# Patient Record
Sex: FEMALE | Race: WHITE | NOT HISPANIC OR LATINO | Employment: STUDENT | URBAN - NONMETROPOLITAN AREA
[De-identification: names, ages, dates, MRNs, and addresses within clinical notes are randomized per-mention and may not be internally consistent; named-entity substitution may affect disease eponyms.]

---
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
## 2022-08-08 ENCOUNTER — TRANSCRIBE ORDERS (OUTPATIENT)
Dept: OBSTETRICS AND GYNECOLOGY | Facility: HOSPITAL | Age: 22
End: 2022-08-08

## 2022-08-08 DIAGNOSIS — Z3A.21 21 WEEKS GESTATION OF PREGNANCY: ICD-10-CM

## 2022-08-08 DIAGNOSIS — Z36.3 ANTENATAL SCREENING FOR MALFORMATION USING ULTRASONICS: Primary | ICD-10-CM

## 2022-08-15 ENCOUNTER — OFFICE VISIT (OUTPATIENT)
Dept: OBSTETRICS AND GYNECOLOGY | Facility: HOSPITAL | Age: 22
End: 2022-08-15

## 2022-08-15 ENCOUNTER — HOSPITAL ENCOUNTER (OUTPATIENT)
Dept: WOMENS IMAGING | Facility: HOSPITAL | Age: 22
Discharge: HOME | End: 2022-08-15
Admitting: OBSTETRICS & GYNECOLOGY

## 2022-08-15 VITALS — WEIGHT: 135.4 LBS | DIASTOLIC BLOOD PRESSURE: 79 MMHG | SYSTOLIC BLOOD PRESSURE: 125 MMHG

## 2022-08-15 DIAGNOSIS — Z36.3 ANTENATAL SCREENING FOR MALFORMATION USING ULTRASONICS: ICD-10-CM

## 2022-08-15 DIAGNOSIS — Z3A.21 21 WEEKS GESTATION OF PREGNANCY: ICD-10-CM

## 2022-08-15 DIAGNOSIS — Z34.90 PREGNANCY, UNSPECIFIED GESTATIONAL AGE: Primary | ICD-10-CM

## 2022-08-15 PROCEDURE — 76805 OB US >/= 14 WKS SNGL FETUS: CPT

## 2022-08-15 PROCEDURE — 76805 OB US >/= 14 WKS SNGL FETUS: CPT | Performed by: OBSTETRICS & GYNECOLOGY

## 2022-08-15 NOTE — PROGRESS NOTES
Documentation of the ultasound findings, images, and interpretations will be available in the patient's Viewpoint report located in the Chart Review Imaging tab in [facility].

## 2022-10-08 ENCOUNTER — HOSPITAL ENCOUNTER (OUTPATIENT)
Facility: HOSPITAL | Age: 22
Discharge: HOME | End: 2022-10-09
Attending: OBSTETRICS & GYNECOLOGY | Admitting: OBSTETRICS & GYNECOLOGY

## 2022-10-08 PROBLEM — O47.03 PRETERM UTERINE CONTRACTIONS IN THIRD TRIMESTER, ANTEPARTUM: Status: ACTIVE | Noted: 2022-10-08

## 2022-10-08 LAB
AMPHET+METHAMPHET UR QL: NEGATIVE
AMPHETAMINES UR QL: NEGATIVE
BARBITURATES UR QL SCN: NEGATIVE
BENZODIAZ UR QL SCN: NEGATIVE
BILIRUB BLD-MCNC: NEGATIVE MG/DL
BUPRENORPHINE SERPL-MCNC: NEGATIVE NG/ML
CANNABINOIDS SERPL QL: NEGATIVE
CLARITY, POC: ABNORMAL
COCAINE UR QL: NEGATIVE
COLOR UR: YELLOW
GLUCOSE UR STRIP-MCNC: NEGATIVE MG/DL
KETONES UR QL: NEGATIVE
LEUKOCYTE EST, POC: NEGATIVE
METHADONE UR QL SCN: NEGATIVE
NITRITE UR-MCNC: NEGATIVE MG/ML
OPIATES UR QL: NEGATIVE
OXYCODONE UR QL SCN: NEGATIVE
PCP UR QL SCN: NEGATIVE
PH UR: 6.5 [PH] (ref 5–8)
PROPOXYPH UR QL: NEGATIVE
PROT UR STRIP-MCNC: NEGATIVE MG/DL
RBC # UR STRIP: NEGATIVE /UL
SP GR UR: 1.02 (ref 1–1.03)
TRICYCLICS UR QL SCN: NEGATIVE
UROBILINOGEN UR QL: NORMAL

## 2022-10-08 PROCEDURE — 81002 URINALYSIS NONAUTO W/O SCOPE: CPT | Performed by: OBSTETRICS & GYNECOLOGY

## 2022-10-08 PROCEDURE — G0463 HOSPITAL OUTPT CLINIC VISIT: HCPCS

## 2022-10-08 PROCEDURE — 80306 DRUG TEST PRSMV INSTRMNT: CPT | Performed by: OBSTETRICS & GYNECOLOGY

## 2022-10-08 RX ORDER — CALCIUM CARBONATE 750 MG/1
750 TABLET, CHEWABLE ORAL DAILY
COMMUNITY

## 2022-10-08 RX ORDER — NIFEDIPINE 10 MG/1
20 CAPSULE ORAL EVERY 8 HOURS SCHEDULED
Status: DISCONTINUED | OUTPATIENT
Start: 2022-10-09 | End: 2022-10-09 | Stop reason: HOSPADM

## 2022-10-08 RX ORDER — SODIUM CHLORIDE 0.9 % (FLUSH) 0.9 %
3 SYRINGE (ML) INJECTION EVERY 12 HOURS SCHEDULED
Status: DISCONTINUED | OUTPATIENT
Start: 2022-10-08 | End: 2022-10-09 | Stop reason: HOSPADM

## 2022-10-08 RX ORDER — SODIUM CHLORIDE, SODIUM LACTATE, POTASSIUM CHLORIDE, CALCIUM CHLORIDE 600; 310; 30; 20 MG/100ML; MG/100ML; MG/100ML; MG/100ML
125 INJECTION, SOLUTION INTRAVENOUS CONTINUOUS
Status: DISCONTINUED | OUTPATIENT
Start: 2022-10-08 | End: 2022-10-09 | Stop reason: HOSPADM

## 2022-10-08 RX ORDER — SODIUM CHLORIDE 0.9 % (FLUSH) 0.9 %
10 SYRINGE (ML) INJECTION AS NEEDED
Status: DISCONTINUED | OUTPATIENT
Start: 2022-10-08 | End: 2022-10-09 | Stop reason: HOSPADM

## 2022-10-08 RX ORDER — FAMOTIDINE 10 MG/ML
20 INJECTION, SOLUTION INTRAVENOUS 2 TIMES DAILY
Status: DISCONTINUED | OUTPATIENT
Start: 2022-10-09 | End: 2022-10-09 | Stop reason: HOSPADM

## 2022-10-08 RX ADMIN — NIFEDIPINE 20 MG: 10 CAPSULE ORAL at 23:22

## 2022-10-08 RX ADMIN — SODIUM CHLORIDE, POTASSIUM CHLORIDE, SODIUM LACTATE AND CALCIUM CHLORIDE 125 ML/HR: 600; 310; 30; 20 INJECTION, SOLUTION INTRAVENOUS at 23:50

## 2022-10-09 ENCOUNTER — HOSPITAL ENCOUNTER (OUTPATIENT)
Facility: HOSPITAL | Age: 22
End: 2022-10-09
Attending: OBSTETRICS & GYNECOLOGY | Admitting: OBSTETRICS & GYNECOLOGY

## 2022-10-09 VITALS
DIASTOLIC BLOOD PRESSURE: 76 MMHG | SYSTOLIC BLOOD PRESSURE: 125 MMHG | HEIGHT: 65 IN | HEART RATE: 96 BPM | RESPIRATION RATE: 18 BRPM | WEIGHT: 150.1 LBS | TEMPERATURE: 98.1 F | OXYGEN SATURATION: 99 % | BODY MASS INDEX: 25.01 KG/M2

## 2022-10-09 PROCEDURE — 96374 THER/PROPH/DIAG INJ IV PUSH: CPT

## 2022-10-09 PROCEDURE — 59025 FETAL NON-STRESS TEST: CPT

## 2022-10-09 PROCEDURE — 59025 FETAL NON-STRESS TEST: CPT | Performed by: OBSTETRICS & GYNECOLOGY

## 2022-10-09 RX ADMIN — FAMOTIDINE 20 MG: 10 INJECTION INTRAVENOUS at 00:08

## 2022-10-09 RX ADMIN — Medication 10 ML: at 00:08

## 2022-10-09 NOTE — SIGNIFICANT NOTE
10/08/22 2330   Vital Signs   Heart Rate 96   /75   Coping with Labor   Non-pharmacologic interventions Massage/pressure;Movement/ambulation/position changes   Pain   Location abdominal;epigastric   Pain Description intermittent;cramping;aching   Nonverbal Indicators of Pain grimace;clenching teeth;muscle tension   Pain Management Interventions relaxation techniques promoted  (See MAR - Procardia given)   Uterine Activity Assessment   Method palpation;per patient report;external tocotransducer   Contraction Frequency (Minutes) 2-3   Contraction Duration (sec) 60-70   Contraction Intensity mild by palpation   Uterine Resting Tone soft by palpation   Uterine Tenderness No   Contraction Pattern regular   Fetal Assessment   Fetal Movement active   Fetal HR Assessment Method external   Fetal HR (beats/min) 120   Fetal HR Baseline normal range   Fetal HR Variability moderate (amplitude range 6 to 25 bpm)   Fetal HR Accelerations episodic;greater than/equal to 15 bpm;greater than/equal to 10 bpm (32 wks gest or less)   Fetal HR Decelerations absent   Labor Care   Change of Maternal Position HOB elevated 45-60 degrees;right tilt   Provider Notification   Reason for Communication Status update   Provider Name Phone report to Dr Katie Hayes @[phone].  Discussed Pt's complaints, contraction pattern, epigastric pain and abdominal pain.  Orders received to give 20mg Nifedipine PO, Start IV and give Pepcid 20mg IV.  Pt may go home if contractions die down and instruct pt to see OB this week.  Pt states she has an appointment on the 11th.  Instructed pt that if she has any problems seeing her OB, she is welcome to come here to our office.  Pt verbalizes understanding.   Notification Route Phone call   Response See orders   SVE closed/thick and -2. Soft and mid position with thin lower uterine segment.

## 2022-10-09 NOTE — NON STRESS TEST
"Triage Note - Nursing Documentation  Labor and Delivery Admission Log    Lorene Walker  : 2000  MRN: [id number]  CSN: [id number]    Date in / Time in:  10/08/2022  Time in:     Date out / Time out:  10/09/2022  Time out: 0110    Nurse: Lauren Donohue, RN    Patient Info: She is a 22 y.o. year old  at 30w0d with an NELA of 2022, Date entered prior to episode creation who was seen on the [facility].    Chief Complaint:   Chief Complaint   Patient presents with   • Laboring     \"I'm 30 weeks and I've been having contractions for the  past 2 hours.  I've drank a bottle of water and sat in the tub for awhile and it feels like they are getting stronger.  I am worried.\"       Provider Instructions / Disposition: See note above for instructions.  IV bolus, Nifedipine, and Pepcid given.  No contractions per pt, monitor, or palpation last 40 minutes of monitoring.  Pt requesting to go home.  Pt states she is not having any more pain.  Discharge instructions given.  Pt sent home with mom. Reiterated that pt needs to be seen this week. Pt and mother verbalized understanding.    Patient Active Problem List   Diagnosis   • Pregnancy   • Asthma   •  uterine contractions in third trimester, antepartum       NST Documentation (Only applicable > 32 weeks): Interpretation A  Nonstress Test Interpretation A: Reactive (10/09/22 0030 : Lauren Donohue, RN)    "

## 2022-10-09 NOTE — NON STRESS TEST
Non Stress Test    [facility]    Patient: Lorene Walker  : 2000  MRN: [id number]  CSN: [id number]    Gestational Age: 30w0d    Indication for NST Irregular uterine contractions       Time On 22:20   Time Off 00:38       Interpretation    Baseline -130's beats per minute   Category 1   Decelerations Absent       Additional Comments See nursing notes       Recommendations for f/u See nursing notes       This note has been electronically signed.    Katie Hayes M.D.

## 2023-07-29 ENCOUNTER — HOSPITAL ENCOUNTER (EMERGENCY)
Facility: HOSPITAL | Age: 23
Discharge: HOME | End: 2023-07-30
Attending: EMERGENCY MEDICINE
Payer: COMMERCIAL

## 2023-07-29 VITALS
OXYGEN SATURATION: 97 % | HEART RATE: 120 BPM | WEIGHT: 124 LBS | TEMPERATURE: 98 F | SYSTOLIC BLOOD PRESSURE: 129 MMHG | DIASTOLIC BLOOD PRESSURE: 102 MMHG | RESPIRATION RATE: 20 BRPM | BODY MASS INDEX: 20.66 KG/M2 | HEIGHT: 65 IN

## 2023-07-29 DIAGNOSIS — R11.2 NAUSEA AND VOMITING, UNSPECIFIED VOMITING TYPE: Primary | ICD-10-CM

## 2023-07-29 LAB
ALBUMIN SERPL-MCNC: 4.4 G/DL (ref 3.5–5.2)
ALBUMIN/GLOB SERPL: 1.4 G/DL
ALP SERPL-CCNC: 84 U/L (ref 39–117)
ALT SERPL W P-5'-P-CCNC: 7 U/L (ref 1–33)
ANION GAP SERPL CALCULATED.3IONS-SCNC: 16 MMOL/L (ref 5–15)
AST SERPL-CCNC: 15 U/L (ref 1–32)
BASOPHILS # BLD AUTO: 0.01 10*3/MM3 (ref 0–0.2)
BASOPHILS NFR BLD AUTO: 0.1 % (ref 0–1.5)
BILIRUB SERPL-MCNC: 0.4 MG/DL (ref 0–1.2)
BUN SERPL-MCNC: 10 MG/DL (ref 6–20)
BUN/CREAT SERPL: 14.1 (ref 7–25)
CALCIUM SPEC-SCNC: 9.2 MG/DL (ref 8.6–10.5)
CHLORIDE SERPL-SCNC: 101 MMOL/L (ref 98–107)
CO2 SERPL-SCNC: 21 MMOL/L (ref 22–29)
CREAT SERPL-MCNC: 0.71 MG/DL (ref 0.57–1)
DEPRECATED RDW RBC AUTO: 37.5 FL (ref 37–54)
EGFRCR SERPLBLD CKD-EPI 2021: 123.5 ML/MIN/1.73
EOSINOPHIL # BLD AUTO: 0.02 10*3/MM3 (ref 0–0.4)
EOSINOPHIL NFR BLD AUTO: 0.2 % (ref 0.3–6.2)
ERYTHROCYTE [DISTWIDTH] IN BLOOD BY AUTOMATED COUNT: 12.1 % (ref 12.3–15.4)
GLOBULIN UR ELPH-MCNC: 3.2 GM/DL
GLUCOSE SERPL-MCNC: 111 MG/DL (ref 65–99)
HCT VFR BLD AUTO: 37.3 % (ref 34–46.6)
HGB BLD-MCNC: 12.7 G/DL (ref 12–15.9)
HOLD SPECIMEN: NORMAL
HOLD SPECIMEN: NORMAL
IMM GRANULOCYTES # BLD AUTO: 0.03 10*3/MM3 (ref 0–0.05)
IMM GRANULOCYTES NFR BLD AUTO: 0.3 % (ref 0–0.5)
LYMPHOCYTES # BLD AUTO: 0.65 10*3/MM3 (ref 0.7–3.1)
LYMPHOCYTES NFR BLD AUTO: 6.3 % (ref 19.6–45.3)
MCH RBC QN AUTO: 29.1 PG (ref 26.6–33)
MCHC RBC AUTO-ENTMCNC: 34 G/DL (ref 31.5–35.7)
MCV RBC AUTO: 85.6 FL (ref 79–97)
MONOCYTES # BLD AUTO: 0.2 10*3/MM3 (ref 0.1–0.9)
MONOCYTES NFR BLD AUTO: 1.9 % (ref 5–12)
NEUTROPHILS NFR BLD AUTO: 9.42 10*3/MM3 (ref 1.7–7)
NEUTROPHILS NFR BLD AUTO: 91.2 % (ref 42.7–76)
NRBC BLD AUTO-RTO: 0 /100 WBC (ref 0–0.2)
PLATELET # BLD AUTO: 231 10*3/MM3 (ref 140–450)
PMV BLD AUTO: 11.1 FL (ref 6–12)
POTASSIUM SERPL-SCNC: 3.6 MMOL/L (ref 3.5–5.2)
PROT SERPL-MCNC: 7.6 G/DL (ref 6–8.5)
RBC # BLD AUTO: 4.36 10*6/MM3 (ref 3.77–5.28)
SODIUM SERPL-SCNC: 138 MMOL/L (ref 136–145)
WBC NRBC COR # BLD: 10.33 10*3/MM3 (ref 3.4–10.8)
WHOLE BLOOD HOLD COAG: NORMAL
WHOLE BLOOD HOLD SPECIMEN: NORMAL

## 2023-07-29 PROCEDURE — 85025 COMPLETE CBC W/AUTO DIFF WBC: CPT | Performed by: EMERGENCY MEDICINE

## 2023-07-29 PROCEDURE — 25010000002 ONDANSETRON PER 1 MG: Performed by: EMERGENCY MEDICINE

## 2023-07-29 PROCEDURE — 93005 ELECTROCARDIOGRAM TRACING: CPT | Performed by: EMERGENCY MEDICINE

## 2023-07-29 PROCEDURE — 80053 COMPREHEN METABOLIC PANEL: CPT | Performed by: EMERGENCY MEDICINE

## 2023-07-29 PROCEDURE — 99283 EMERGENCY DEPT VISIT LOW MDM: CPT

## 2023-07-29 PROCEDURE — 96374 THER/PROPH/DIAG INJ IV PUSH: CPT

## 2023-07-29 PROCEDURE — 96361 HYDRATE IV INFUSION ADD-ON: CPT

## 2023-07-29 RX ORDER — SODIUM CHLORIDE 0.9 % (FLUSH) 0.9 %
10 SYRINGE (ML) INJECTION AS NEEDED
Status: DISCONTINUED | OUTPATIENT
Start: 2023-07-29 | End: 2023-07-30 | Stop reason: HOSPADM

## 2023-07-29 RX ORDER — ONDANSETRON 2 MG/ML
4 INJECTION INTRAMUSCULAR; INTRAVENOUS ONCE
Status: COMPLETED | OUTPATIENT
Start: 2023-07-29 | End: 2023-07-29

## 2023-07-29 RX ADMIN — ONDANSETRON 4 MG: 2 INJECTION INTRAMUSCULAR; INTRAVENOUS at 23:14

## 2023-07-29 RX ADMIN — SODIUM CHLORIDE 1000 ML: 9 INJECTION, SOLUTION INTRAVENOUS at 23:14

## 2023-07-29 NOTE — Clinical Note
[facility] EMERGENCY DEPARTMENT  [address] [address]  Phone: [phone]    alan accompanied Lorene Walker to the emergency department on 7/29/2023. They may return to work on 07/31/2023.        Thank you for choosing [facility].    Cheng Posada, DO

## 2023-07-30 RX ORDER — ONDANSETRON 4 MG/1
4 TABLET, ORALLY DISINTEGRATING ORAL 4 TIMES DAILY PRN
Qty: 20 TABLET | Refills: 0 | Status: SHIPPED | OUTPATIENT
Start: 2023-07-30

## 2023-07-30 NOTE — ED PROVIDER NOTES
"Subjective   History of Present Illness  21-year-old female presents to the ED with a chief complaint of syncope.  Patient's real complaint is nausea and vomiting that started suddenly tonight around 5 PM.  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  She states that she felt a little overheated.  She states that she has been forcefully vomiting and then having near syncopal events or very brief syncopal events after she vomits.  She states that she will have a large episode of vomiting and then will feel real weak and kind of \"nod off\".  No chest pain.  No abdominal pain.  No other complaints at this time.      Review of Systems   Constitutional:  Negative for fatigue and fever.   Cardiovascular:  Negative for chest pain and palpitations.   Gastrointestinal:  Positive for nausea and vomiting. Negative for abdominal pain.   Neurological:  Positive for syncope.   All other systems reviewed and are negative.    Past Medical History:   Diagnosis Date    Anemia 2021    with first pregnancy    Asthma 2009    Hasn't used an inhaler for the last 10 years    Rh incompatibility     received 4 doses of Rhogam in hte first pregnancy       No Known Allergies    Past Surgical History:   Procedure Laterality Date    TONSILLECTOMY AND ADENOIDECTOMY  2004       History reviewed. No pertinent family history.    Social History     Socioeconomic History    Marital status:      Spouse name: Christophe Santos    Number of children: 1    Years of education: [employer]    Highest education level: Associate degree: occupational, technical, or vocational program   Tobacco Use    Smoking status: Never           Objective   Physical Exam  Vitals and nursing note reviewed.   Constitutional:       Appearance: Normal appearance.   HENT:      Head: Normocephalic and atraumatic.   Cardiovascular:      Rate and Rhythm: " "Normal rate.      Pulses: Normal pulses.   Pulmonary:      Effort: Pulmonary effort is normal.      Breath sounds: Normal breath sounds.   Abdominal:      Palpations: Abdomen is soft.      Tenderness: There is no abdominal tenderness.   Neurological:      General: No focal deficit present.      Mental Status: She is alert and oriented to person, place, and time.       Procedures           ED Course                                           Medical Decision Making  Problems Addressed:  Nausea and vomiting, unspecified vomiting type: complicated acute illness or injury    Amount and/or Complexity of Data Reviewed  Labs: ordered.  ECG/medicine tests: ordered.    Risk  Prescription drug management.    Data interpreted: Nursing notes reviewed vital signs reviewed Labs independently interpreted interpretative by me.  Imaging interpretative by me.  EKG independently interpreted by me    Oxygen saturations included.    Counseling discussed the results above with the patient regarding need for admission or discharge.  Patient understands and agrees with plan of care    23-year-old female presents to the ED with chief complaint of nausea and vomiting leading to near syncopal or syncopal events.  Multiple episodes of vomiting leading the patient to \"not often\".  Denied any chest pain or shortness of breath on arrival to the ED.  She was treated symptomatically with IV fluid rehydration and antiemetics.  On reevaluation her heart rate is improved.  Again asked the patient if she has been having any chest pain or shortness of breath she said no.  She states that all of her episodes of syncope occurred after the vomiting.  Suspect most likely viral gastrointestinal illness.  She is resting comfortably.  She is appropriate for discharge to follow-up outpatient as needed she is agreeable to this plan.    Final diagnoses:   Nausea and vomiting, unspecified vomiting type       ED Disposition  ED Disposition       ED Disposition "   Discharge    Condition   Stable    Comment   --               PATIENT CONNECTION - [facility] [id number]  [phone]             Medication List        Changed      * ondansetron ODT 4 MG disintegrating tablet  Commonly known as: ZOFRAN-ODT  Take 1 tablet by mouth Every 8 (Eight) Hours As Needed for Nausea or Vomiting.  What changed: Another medication with the same name was added. Make sure you understand how and when to take each.     * ondansetron ODT 4 MG disintegrating tablet  Commonly known as: ZOFRAN-ODT  Place 1 tablet on the tongue 4 (Four) Times a Day As Needed for Nausea.  What changed: You were already taking a medication with the same name, and this prescription was added. Make sure you understand how and when to take each.           * This list has 2 medication(s) that are the same as other medications prescribed for you. Read the directions carefully, and ask your doctor or other care provider to review them with you.                   Where to Get Your Medications        These medications were sent to [facility] [id number] - [facility] [address] AT [facility] - [phone] [facility] [phone]   [address] [facility] [facility] [address]      Phone: [phone]   ondansetron ODT 4 MG disintegrating tablet            Cheng Posada, DO  07/30/23 0337

## 2023-08-22 ENCOUNTER — HOSPITAL ENCOUNTER (EMERGENCY)
Facility: HOSPITAL | Age: 23
Discharge: HOME | End: 2023-08-23
Attending: EMERGENCY MEDICINE
Payer: COMMERCIAL

## 2023-08-22 DIAGNOSIS — B34.9 VIRAL SYNDROME: Primary | ICD-10-CM

## 2023-08-22 LAB
FLUAV RNA RESP QL NAA+PROBE: NOT DETECTED
FLUBV RNA RESP QL NAA+PROBE: NOT DETECTED
SARS-COV-2 RNA RESP QL NAA+PROBE: NOT DETECTED

## 2023-08-22 PROCEDURE — 99283 EMERGENCY DEPT VISIT LOW MDM: CPT

## 2023-08-22 PROCEDURE — 87636 SARSCOV2 & INF A&B AMP PRB: CPT

## 2023-08-22 PROCEDURE — 93005 ELECTROCARDIOGRAM TRACING: CPT

## 2023-08-22 RX ORDER — ACETAMINOPHEN 325 MG/1
650 TABLET ORAL ONCE
Status: COMPLETED | OUTPATIENT
Start: 2023-08-22 | End: 2023-08-22

## 2023-08-22 RX ORDER — IBUPROFEN 600 MG/1
600 TABLET ORAL ONCE
Status: COMPLETED | OUTPATIENT
Start: 2023-08-22 | End: 2023-08-22

## 2023-08-22 RX ADMIN — IBUPROFEN 600 MG: 600 TABLET ORAL at 23:55

## 2023-08-22 RX ADMIN — ACETAMINOPHEN 650 MG: 325 TABLET, FILM COATED ORAL at 23:55

## 2023-08-23 VITALS
OXYGEN SATURATION: 97 % | BODY MASS INDEX: 19.93 KG/M2 | HEART RATE: 114 BPM | RESPIRATION RATE: 20 BRPM | SYSTOLIC BLOOD PRESSURE: 125 MMHG | HEIGHT: 65 IN | DIASTOLIC BLOOD PRESSURE: 78 MMHG | WEIGHT: 119.6 LBS | TEMPERATURE: 98 F

## 2023-08-24 NOTE — ED PROVIDER NOTES
"Subjective  History of Present Illness:    Chief Complaint: Chills fever body aches    History of Present Illness: 23-year-old female presents with chills fever and body aches, decreased appetite, recent hand-foot-and-mouth exposure but no symptoms.  No urinary symptoms    Nurses Notes reviewed and agree, including vitals, allergies, social history and prior medical history.     REVIEW OF SYSTEMS: All systems reviewed and not pertinent unless noted.  Review of Systems      Positive for: Fever chills body aches    Negative for: Rash shortness of breath cough wheezing    Past Medical History:   Diagnosis Date    Anemia 2021    with first pregnancy    Asthma 2009    Hasn't used an inhaler for the last 10 years    Rh incompatibility     received 4 doses of Rhogam in hte first pregnancy       Allergies:    Patient has no known allergies.      Past Surgical History:   Procedure Laterality Date    TONSILLECTOMY AND ADENOIDECTOMY  2004         Social History     Socioeconomic History    Marital status:      Spouse name: Christophe Santos    Number of children: 1    Years of education: [email or web address]    Highest education level: Associate degree: occupational, technical, or vocational program   Tobacco Use    Smoking status: Never         History reviewed. No pertinent family history.    Objective  Physical Exam:  /78 (BP Location: Right arm, Patient Position: Sitting)   Pulse 114 Comment: Dr. Brady aware of pts heart rate, okayed to discharge.  Temp 98 øF (36.7 øC) (Oral)   Resp 20   Ht 165.1 cm (65\")   Wt 54.3 kg (119 lb 9.6 oz)   LMP 08/08/2023 (Exact Date)   SpO2 97%   Breastfeeding No   BMI 19.90 kg/mý      Physical Exam    CONSTITUTIONAL: Well developed, nontoxic 23-year-old female,  in no acute distress.  VITAL SIGNS: per nursing, reviewed and noted  SKIN: exposed skin with no rashes, ulcerations or petechiae  EYES: Grossly EOMI, no icterus  ENT: Normal voice.  Moist mucous membranes posterior pharynx " erythema exudate moist mucous membranes  RESPIRATORY:  No increased work of breathing. No retractions.  Chest clear  CARDIOVASCULAR:   Extremities pink and warm.  Good cap refill to extremities.   GI: Abdomen without distention   MUSCULOSKELETAL: Age-appropriate bulk and tone, moves all 4 extremities  NEUROLOGIC: Alert, oriented x 3. No gross deficits. GCS 15.   PSYCH: appropriate affect.  : no bladder tenderness or distention, no CVA tenderness    Procedures    ED Course:    Lab Results (last 24 hours)       ** No results found for the last 24 hours. **             No radiology results from the last 24 hrs     MDM     Amount and/or Complexity of Data Reviewed  Clinical lab tests: reviewed  Tests in the medicine section of CPTr: reviewed        ED Course as of 08/24/23 0559   Tue Aug 22, 2023   2111 EKG interpreted by me sinus tachycardia rate of 126 and ST-T wave changes no ectopy no skin changes [PF]      ED Course User Index  [PF] Bernardino Brady, DO       Medical Decision Making:    Initial impression of presenting illness: 23-year-old female presents with chills fever and body aches, decreased appetite, recent hand-foot-and-mouth exposure but no symptoms.  No urinary symptoms    DDX: includes but is not limited to: Viral syndrome         Patient arrives afebrile slight tachycardia sats 97% with vitals interpreted by myself.     Pertinent features from physical exam: Benign exam.    Initial diagnostic plan:  COVID testing, flu    Results from initial plan were reviewed and interpreted by me revealing negative fluid covid    Diagnostic information from other sources: None    Interventions / Re-evaluation: Motrin Tylenol     Results/clinical rationale were discussed with patient    Consultations/Discussion of results with other physicians:      Disposition plan:  Patient is nontoxic here, suspect viral syndrome.  No urinary symptoms, deferred phlebotomy, patient had low suspicion for UTI, I deferred a UA.   Patient was discharged in home stable condition.  Counseled on supportive care, outpatient follow-up. Return precaution discussed.  Patient/family was understanding and agreeable with plan    -----      Final diagnoses:   Viral syndrome            Bernardino Brady,   08/24/23 0601

## 2023-10-04 ENCOUNTER — HOSPITAL ENCOUNTER (EMERGENCY)
Facility: HOSPITAL | Age: 23
Discharge: HOME | End: 2023-10-04
Attending: EMERGENCY MEDICINE | Admitting: EMERGENCY MEDICINE
Payer: COMMERCIAL

## 2023-10-04 VITALS
RESPIRATION RATE: 20 BRPM | WEIGHT: 120.4 LBS | HEIGHT: 65 IN | BODY MASS INDEX: 20.06 KG/M2 | HEART RATE: 110 BPM | OXYGEN SATURATION: 96 % | TEMPERATURE: 98.6 F | DIASTOLIC BLOOD PRESSURE: 68 MMHG | SYSTOLIC BLOOD PRESSURE: 115 MMHG

## 2023-10-04 DIAGNOSIS — R55 SYNCOPE, UNSPECIFIED SYNCOPE TYPE: Primary | ICD-10-CM

## 2023-10-04 DIAGNOSIS — E87.6 HYPOKALEMIA: ICD-10-CM

## 2023-10-04 LAB
ALBUMIN SERPL-MCNC: 4.8 G/DL (ref 3.5–5.2)
ALBUMIN/GLOB SERPL: 1.7 G/DL
ALP SERPL-CCNC: 93 U/L (ref 39–117)
ALT SERPL W P-5'-P-CCNC: 7 U/L (ref 1–33)
ANION GAP SERPL CALCULATED.3IONS-SCNC: 15.7 MMOL/L (ref 5–15)
AST SERPL-CCNC: 15 U/L (ref 1–32)
B-HCG UR QL: NEGATIVE
BASOPHILS # BLD AUTO: 0.05 10*3/MM3 (ref 0–0.2)
BASOPHILS NFR BLD AUTO: 0.6 % (ref 0–1.5)
BILIRUB SERPL-MCNC: 0.4 MG/DL (ref 0–1.2)
BUN SERPL-MCNC: 8 MG/DL (ref 6–20)
BUN/CREAT SERPL: 11.1 (ref 7–25)
CALCIUM SPEC-SCNC: 9.7 MG/DL (ref 8.6–10.5)
CHLORIDE SERPL-SCNC: 103 MMOL/L (ref 98–107)
CO2 SERPL-SCNC: 20.3 MMOL/L (ref 22–29)
CREAT SERPL-MCNC: 0.72 MG/DL (ref 0.57–1)
DEPRECATED RDW RBC AUTO: 40.8 FL (ref 37–54)
EGFRCR SERPLBLD CKD-EPI 2021: 121.4 ML/MIN/1.73
EOSINOPHIL # BLD AUTO: 0.08 10*3/MM3 (ref 0–0.4)
EOSINOPHIL NFR BLD AUTO: 1 % (ref 0.3–6.2)
ERYTHROCYTE [DISTWIDTH] IN BLOOD BY AUTOMATED COUNT: 12.9 % (ref 12.3–15.4)
FLUAV RNA RESP QL NAA+PROBE: NOT DETECTED
FLUBV RNA RESP QL NAA+PROBE: NOT DETECTED
GLOBULIN UR ELPH-MCNC: 2.9 GM/DL
GLUCOSE SERPL-MCNC: 87 MG/DL (ref 65–99)
HCT VFR BLD AUTO: 37.6 % (ref 34–46.6)
HGB BLD-MCNC: 13 G/DL (ref 12–15.9)
HOLD SPECIMEN: NORMAL
HOLD SPECIMEN: NORMAL
IMM GRANULOCYTES # BLD AUTO: 0.02 10*3/MM3 (ref 0–0.05)
IMM GRANULOCYTES NFR BLD AUTO: 0.2 % (ref 0–0.5)
LYMPHOCYTES # BLD AUTO: 1.9 10*3/MM3 (ref 0.7–3.1)
LYMPHOCYTES NFR BLD AUTO: 22.8 % (ref 19.6–45.3)
MAGNESIUM SERPL-MCNC: 1.7 MG/DL (ref 1.6–2.6)
MCH RBC QN AUTO: 29.8 PG (ref 26.6–33)
MCHC RBC AUTO-ENTMCNC: 34.6 G/DL (ref 31.5–35.7)
MCV RBC AUTO: 86.2 FL (ref 79–97)
MONOCYTES # BLD AUTO: 0.49 10*3/MM3 (ref 0.1–0.9)
MONOCYTES NFR BLD AUTO: 5.9 % (ref 5–12)
NEUTROPHILS NFR BLD AUTO: 5.79 10*3/MM3 (ref 1.7–7)
NEUTROPHILS NFR BLD AUTO: 69.5 % (ref 42.7–76)
NRBC BLD AUTO-RTO: 0 /100 WBC (ref 0–0.2)
PLATELET # BLD AUTO: 235 10*3/MM3 (ref 140–450)
PMV BLD AUTO: 11.6 FL (ref 6–12)
POTASSIUM SERPL-SCNC: 3.1 MMOL/L (ref 3.5–5.2)
PROT SERPL-MCNC: 7.7 G/DL (ref 6–8.5)
RBC # BLD AUTO: 4.36 10*6/MM3 (ref 3.77–5.28)
SARS-COV-2 RNA RESP QL NAA+PROBE: NOT DETECTED
SODIUM SERPL-SCNC: 139 MMOL/L (ref 136–145)
TROPONIN T SERPL HS-MCNC: <6 NG/L
WBC NRBC COR # BLD: 8.33 10*3/MM3 (ref 3.4–10.8)
WHOLE BLOOD HOLD COAG: NORMAL
WHOLE BLOOD HOLD SPECIMEN: NORMAL

## 2023-10-04 PROCEDURE — 87636 SARSCOV2 & INF A&B AMP PRB: CPT

## 2023-10-04 PROCEDURE — 25810000003 LACTATED RINGERS SOLUTION: Performed by: EMERGENCY MEDICINE

## 2023-10-04 PROCEDURE — 81025 URINE PREGNANCY TEST: CPT | Performed by: EMERGENCY MEDICINE

## 2023-10-04 PROCEDURE — 85025 COMPLETE CBC W/AUTO DIFF WBC: CPT

## 2023-10-04 PROCEDURE — 36415 COLL VENOUS BLD VENIPUNCTURE: CPT

## 2023-10-04 PROCEDURE — 99283 EMERGENCY DEPT VISIT LOW MDM: CPT

## 2023-10-04 PROCEDURE — 80053 COMPREHEN METABOLIC PANEL: CPT

## 2023-10-04 PROCEDURE — 84484 ASSAY OF TROPONIN QUANT: CPT

## 2023-10-04 PROCEDURE — 83735 ASSAY OF MAGNESIUM: CPT

## 2023-10-04 PROCEDURE — 93005 ELECTROCARDIOGRAM TRACING: CPT

## 2023-10-04 RX ORDER — SODIUM CHLORIDE 0.9 % (FLUSH) 0.9 %
10 SYRINGE (ML) INJECTION AS NEEDED
Status: DISCONTINUED | OUTPATIENT
Start: 2023-10-04 | End: 2023-10-05 | Stop reason: HOSPADM

## 2023-10-04 RX ORDER — HYDROXYZINE PAMOATE 50 MG/1
50 CAPSULE ORAL ONCE
Status: COMPLETED | OUTPATIENT
Start: 2023-10-04 | End: 2023-10-04

## 2023-10-04 RX ORDER — ACETAMINOPHEN 500 MG
1000 TABLET ORAL ONCE
Status: COMPLETED | OUTPATIENT
Start: 2023-10-04 | End: 2023-10-04

## 2023-10-04 RX ORDER — POTASSIUM CHLORIDE 750 MG/1
40 CAPSULE, EXTENDED RELEASE ORAL ONCE
Status: COMPLETED | OUTPATIENT
Start: 2023-10-04 | End: 2023-10-04

## 2023-10-04 RX ADMIN — SODIUM CHLORIDE, POTASSIUM CHLORIDE, SODIUM LACTATE AND CALCIUM CHLORIDE 1000 ML: 600; 310; 30; 20 INJECTION, SOLUTION INTRAVENOUS at 20:19

## 2023-10-04 RX ADMIN — ACETAMINOPHEN 1000 MG: 500 TABLET, FILM COATED ORAL at 20:12

## 2023-10-04 RX ADMIN — POTASSIUM CHLORIDE 40 MEQ: 10 CAPSULE, COATED, EXTENDED RELEASE ORAL at 20:12

## 2023-10-04 RX ADMIN — HYDROXYZINE PAMOATE 50 MG: 50 CAPSULE ORAL at 20:12

## 2023-10-04 NOTE — ED PROVIDER NOTES
HPI: Lorene Walker is a 23 y.o. female who presents to the emergency department complaining of syncope.  Patient states that for the last 2 years or so she has had intermittent episodes of syncope.  This has been worse the last week or so.  She now has subjective fevers, body aches, cough, congestion.  She denies any shortness of breath, chest pain.  She tells me that she can feel the episodes coming on and does not fall.      REVIEW OF SYSTEMS: All other systems reviewed and are negative     PAST MEDICAL HISTORY:   Past Medical History:   Diagnosis Date    Anemia 2021    with first pregnancy    Asthma 2009    Hasn't used an inhaler for the last 10 years    Rh incompatibility     received 4 doses of Rhogam in hte first pregnancy        FAMILY HISTORY:   History reviewed. No pertinent family history.     SOCIAL HISTORY:   Social History     Socioeconomic History    Marital status:      Spouse name: Christophe Santos    Number of children: 1    Years of education: [employer]    Highest education level: Associate degree: occupational, technical, or vocational program   Tobacco Use    Smoking status: Never        SURGICAL HISTORY:   Past Surgical History:   Procedure Laterality Date    TONSILLECTOMY AND ADENOIDECTOMY  2004        ALLERGIES: Patient has no known allergies.       PHYSICAL EXAM:   VITAL SIGNS:   Vitals:    10/04/23 2016   BP: 124/84   Pulse: (!) 126   Resp:    Temp:    SpO2:       CONSTITUTIONAL: Awake, well appearing, nontoxic   HENT: Atraumatic, normocephalic, oral mucosa moist, airway patent. Nares patent without drainage. External ears normal.   EYES: Conjunctivae clear, EOMI, PERRL   NECK: Trachea midline, nontender, supple   CARDIOVASCULAR: Tachycardic, Normal rhythm.  PULMONARY/CHEST: Normal work of breathing. Clear to auscultation, no rhonchi, wheezes, or rales.  ABDOMINAL: Nondistended, soft, nontender, no rebound or guarding.  NEUROLOGIC: Nonfocal, moves all four extremities, no gross  sensory or motor deficits.   EXTREMITIES: No clubbing, cyanosis, or edema   SKIN: Warm, Dry, No erythema, No rash       ED COURSE / MEDICAL DECISION MAKING:     Loreen Walker is a 23 y.o. female who presents to the emergency department for evaluation of syncope, body aches.  Well-developed, well-nourished young female in no distress with exam as above.  Her vital signs are notable for tachycardia and mild hypertension.  Her exam is otherwise nonfocal.  Her oxygen saturation is normal on room air when they are present.  Will obtain labs, EKG, COVID/flu.  Will give symptomatic treatment.  Disposition pending.    Differential diagnosis includes viral illness, dehydration, orthostasis, electrolyte derangement, anxiety, arrhythmia among other etiologies.    EKG interpreted by me: Sinus tachycardia, no acute ST changes, some nonspecific T waves, this is an abnormal EKG secondary to rate    Patient resting comfortably, blood pressure and heart rate have improved, on my evaluation heart rate now in the 100s.  She feels improved.  Work-up notable for mild hypokalemia, otherwise nonactionable.  I do feel she is safe for discharge home at this time.  Advised outpatient follow-up.  She is happy with this plan.    Final diagnoses:   Syncope, unspecified syncope type   Hypokalemia        Tonny Savage MD  10/04/23 6625

## 2023-10-05 NOTE — ED NOTES
"Patient states to staff \"she passed out 5 mins ago\" Patient rates her pain 0-10 as a 5, says it feels like pins and needles in her whole body. Nurse notified   "

## 2024-01-26 ENCOUNTER — TRANSCRIBE ORDERS (OUTPATIENT)
Dept: ADMINISTRATIVE | Facility: HOSPITAL | Age: 24
End: 2024-01-26
Payer: COMMERCIAL

## 2024-01-26 DIAGNOSIS — R06.02 SHORTNESS OF BREATH: Primary | ICD-10-CM

## 2024-01-26 DIAGNOSIS — R07.9 CHEST PAIN, UNSPECIFIED TYPE: ICD-10-CM

## 2024-02-08 ENCOUNTER — HOSPITAL ENCOUNTER (OUTPATIENT)
Dept: CT IMAGING | Facility: HOSPITAL | Age: 24
Discharge: HOME | End: 2024-02-08
Admitting: INTERNAL MEDICINE
Payer: COMMERCIAL

## 2024-02-08 DIAGNOSIS — R07.9 CHEST PAIN, UNSPECIFIED TYPE: ICD-10-CM

## 2024-02-08 DIAGNOSIS — R06.02 SHORTNESS OF BREATH: ICD-10-CM

## 2024-02-08 PROCEDURE — 71275 CT ANGIOGRAPHY CHEST: CPT

## 2024-02-08 PROCEDURE — 25510000001 IOPAMIDOL PER 1 ML: Performed by: INTERNAL MEDICINE

## 2024-02-08 RX ADMIN — IOPAMIDOL 80 ML: 755 INJECTION, SOLUTION INTRAVENOUS at 12:55

## 2025-03-14 ENCOUNTER — APPOINTMENT (OUTPATIENT)
Dept: ULTRASOUND IMAGING | Facility: HOSPITAL | Age: 25
End: 2025-03-14
Payer: COMMERCIAL

## 2025-03-14 ENCOUNTER — APPOINTMENT (OUTPATIENT)
Dept: CT IMAGING | Facility: HOSPITAL | Age: 25
End: 2025-03-14
Payer: COMMERCIAL

## 2025-03-14 ENCOUNTER — HOSPITAL ENCOUNTER (INPATIENT)
Facility: HOSPITAL | Age: 25
LOS: 1 days | Discharge: HOME | End: 2025-03-15
Attending: EMERGENCY MEDICINE | Admitting: STUDENT IN AN ORGANIZED HEALTH CARE EDUCATION/TRAINING PROGRAM
Payer: COMMERCIAL

## 2025-03-14 DIAGNOSIS — K37 APPENDICITIS: ICD-10-CM

## 2025-03-14 DIAGNOSIS — K35.32 RUPTURED APPENDICITIS: Primary | ICD-10-CM

## 2025-03-14 LAB
ALBUMIN SERPL-MCNC: 4.6 G/DL (ref 3.5–5.2)
ALBUMIN/GLOB SERPL: 1.5 G/DL
ALP SERPL-CCNC: 100 U/L (ref 39–117)
ALT SERPL W P-5'-P-CCNC: 7 U/L (ref 1–33)
ANION GAP SERPL CALCULATED.3IONS-SCNC: 13.2 MMOL/L (ref 5–15)
AST SERPL-CCNC: 13 U/L (ref 1–32)
B-HCG UR QL: NEGATIVE
BACTERIA UR QL AUTO: ABNORMAL /HPF
BASOPHILS # BLD AUTO: 0.03 10*3/MM3 (ref 0–0.2)
BASOPHILS NFR BLD AUTO: 0.2 % (ref 0–1.5)
BILIRUB SERPL-MCNC: 0.6 MG/DL (ref 0–1.2)
BILIRUB UR QL STRIP: NEGATIVE
BUN SERPL-MCNC: 8 MG/DL (ref 6–20)
BUN/CREAT SERPL: 10.5 (ref 7–25)
CALCIUM SPEC-SCNC: 9.6 MG/DL (ref 8.6–10.5)
CHLORIDE SERPL-SCNC: 99 MMOL/L (ref 98–107)
CLARITY UR: CLEAR
CO2 SERPL-SCNC: 23.8 MMOL/L (ref 22–29)
COLOR UR: ABNORMAL
CREAT SERPL-MCNC: 0.76 MG/DL (ref 0.57–1)
D-LACTATE SERPL-SCNC: 0.8 MMOL/L (ref 0.5–2)
DEPRECATED RDW RBC AUTO: 37.6 FL (ref 37–54)
EGFRCR SERPLBLD CKD-EPI 2021: 113.1 ML/MIN/1.73
EOSINOPHIL # BLD AUTO: 0.22 10*3/MM3 (ref 0–0.4)
EOSINOPHIL NFR BLD AUTO: 1.7 % (ref 0.3–6.2)
ERYTHROCYTE [DISTWIDTH] IN BLOOD BY AUTOMATED COUNT: 11.9 % (ref 12.3–15.4)
GLOBULIN UR ELPH-MCNC: 3 GM/DL
GLUCOSE SERPL-MCNC: 131 MG/DL (ref 65–99)
GLUCOSE UR STRIP-MCNC: NEGATIVE MG/DL
HCT VFR BLD AUTO: 38.6 % (ref 34–46.6)
HGB BLD-MCNC: 13.9 G/DL (ref 12–15.9)
HGB UR QL STRIP.AUTO: NEGATIVE
HOLD SPECIMEN: NORMAL
HOLD SPECIMEN: NORMAL
HYALINE CASTS UR QL AUTO: ABNORMAL /LPF
IMM GRANULOCYTES # BLD AUTO: 0.04 10*3/MM3 (ref 0–0.05)
IMM GRANULOCYTES NFR BLD AUTO: 0.3 % (ref 0–0.5)
KETONES UR QL STRIP: ABNORMAL
LEUKOCYTE ESTERASE UR QL STRIP.AUTO: ABNORMAL
LIPASE SERPL-CCNC: 22 U/L (ref 13–60)
LYMPHOCYTES # BLD AUTO: 1.83 10*3/MM3 (ref 0.7–3.1)
LYMPHOCYTES NFR BLD AUTO: 13.7 % (ref 19.6–45.3)
MCH RBC QN AUTO: 31 PG (ref 26.6–33)
MCHC RBC AUTO-ENTMCNC: 36 G/DL (ref 31.5–35.7)
MCV RBC AUTO: 86.2 FL (ref 79–97)
MONOCYTES # BLD AUTO: 0.55 10*3/MM3 (ref 0.1–0.9)
MONOCYTES NFR BLD AUTO: 4.1 % (ref 5–12)
MUCOUS THREADS URNS QL MICRO: ABNORMAL /HPF
NEUTROPHILS NFR BLD AUTO: 10.64 10*3/MM3 (ref 1.7–7)
NEUTROPHILS NFR BLD AUTO: 80 % (ref 42.7–76)
NITRITE UR QL STRIP: NEGATIVE
NRBC BLD AUTO-RTO: 0 /100 WBC (ref 0–0.2)
PH UR STRIP.AUTO: 6 [PH] (ref 5–8)
PLATELET # BLD AUTO: 288 10*3/MM3 (ref 140–450)
PMV BLD AUTO: 10.3 FL (ref 6–12)
POTASSIUM SERPL-SCNC: 3.3 MMOL/L (ref 3.5–5.2)
PROT SERPL-MCNC: 7.6 G/DL (ref 6–8.5)
PROT UR QL STRIP: ABNORMAL
RBC # BLD AUTO: 4.48 10*6/MM3 (ref 3.77–5.28)
RBC # UR STRIP: ABNORMAL /HPF
REF LAB TEST METHOD: ABNORMAL
SODIUM SERPL-SCNC: 136 MMOL/L (ref 136–145)
SP GR UR STRIP: 1.03 (ref 1–1.03)
SQUAMOUS #/AREA URNS HPF: ABNORMAL /HPF
UROBILINOGEN UR QL STRIP: ABNORMAL
WBC # UR STRIP: ABNORMAL /HPF
WBC NRBC COR # BLD AUTO: 13.31 10*3/MM3 (ref 3.4–10.8)
WHOLE BLOOD HOLD COAG: NORMAL
WHOLE BLOOD HOLD SPECIMEN: NORMAL

## 2025-03-14 PROCEDURE — 83605 ASSAY OF LACTIC ACID: CPT | Performed by: EMERGENCY MEDICINE

## 2025-03-14 PROCEDURE — 25010000002 VANCOMYCIN HCL 1.25 G RECONSTITUTED SOLUTION 1 EACH VIAL: Performed by: STUDENT IN AN ORGANIZED HEALTH CARE EDUCATION/TRAINING PROGRAM

## 2025-03-14 PROCEDURE — 25010000002 KETOROLAC TROMETHAMINE PER 15 MG: Performed by: EMERGENCY MEDICINE

## 2025-03-14 PROCEDURE — 25810000003 LACTATED RINGERS PER 1000 ML: Performed by: EMERGENCY MEDICINE

## 2025-03-14 PROCEDURE — 25010000002 PIPERACILLIN SOD-TAZOBACTAM PER 1 G: Performed by: EMERGENCY MEDICINE

## 2025-03-14 PROCEDURE — 25810000003 SODIUM CHLORIDE 0.9 % SOLUTION 250 ML FLEX CONT: Performed by: STUDENT IN AN ORGANIZED HEALTH CARE EDUCATION/TRAINING PROGRAM

## 2025-03-14 PROCEDURE — 80053 COMPREHEN METABOLIC PANEL: CPT | Performed by: EMERGENCY MEDICINE

## 2025-03-14 PROCEDURE — 25010000002 ENOXAPARIN PER 10 MG: Performed by: STUDENT IN AN ORGANIZED HEALTH CARE EDUCATION/TRAINING PROGRAM

## 2025-03-14 PROCEDURE — 74177 CT ABD & PELVIS W/CONTRAST: CPT

## 2025-03-14 PROCEDURE — 85025 COMPLETE CBC W/AUTO DIFF WBC: CPT | Performed by: EMERGENCY MEDICINE

## 2025-03-14 PROCEDURE — 25010000002 MORPHINE PER 10 MG: Performed by: STUDENT IN AN ORGANIZED HEALTH CARE EDUCATION/TRAINING PROGRAM

## 2025-03-14 PROCEDURE — 25010000002 MORPHINE PER 10 MG: Performed by: EMERGENCY MEDICINE

## 2025-03-14 PROCEDURE — 81001 URINALYSIS AUTO W/SCOPE: CPT | Performed by: EMERGENCY MEDICINE

## 2025-03-14 PROCEDURE — 25510000001 IOPAMIDOL 61 % SOLUTION: Performed by: EMERGENCY MEDICINE

## 2025-03-14 PROCEDURE — 87641 MR-STAPH DNA AMP PROBE: CPT | Performed by: STUDENT IN AN ORGANIZED HEALTH CARE EDUCATION/TRAINING PROGRAM

## 2025-03-14 PROCEDURE — 25010000002 ONDANSETRON PER 1 MG: Performed by: EMERGENCY MEDICINE

## 2025-03-14 PROCEDURE — 81025 URINE PREGNANCY TEST: CPT | Performed by: EMERGENCY MEDICINE

## 2025-03-14 PROCEDURE — 87040 BLOOD CULTURE FOR BACTERIA: CPT | Performed by: STUDENT IN AN ORGANIZED HEALTH CARE EDUCATION/TRAINING PROGRAM

## 2025-03-14 PROCEDURE — 83690 ASSAY OF LIPASE: CPT | Performed by: EMERGENCY MEDICINE

## 2025-03-14 PROCEDURE — 99285 EMERGENCY DEPT VISIT HI MDM: CPT | Performed by: EMERGENCY MEDICINE

## 2025-03-14 PROCEDURE — 99222 1ST HOSP IP/OBS MODERATE 55: CPT | Performed by: STUDENT IN AN ORGANIZED HEALTH CARE EDUCATION/TRAINING PROGRAM

## 2025-03-14 PROCEDURE — 76705 ECHO EXAM OF ABDOMEN: CPT

## 2025-03-14 PROCEDURE — 25010000002 DIPHENHYDRAMINE PER 50 MG: Performed by: FAMILY MEDICINE

## 2025-03-14 RX ORDER — SODIUM CHLORIDE 0.9 % (FLUSH) 0.9 %
10 SYRINGE (ML) INJECTION AS NEEDED
Status: DISCONTINUED | OUTPATIENT
Start: 2025-03-14 | End: 2025-03-15 | Stop reason: HOSPADM

## 2025-03-14 RX ORDER — ENOXAPARIN SODIUM 100 MG/ML
30 INJECTION SUBCUTANEOUS NIGHTLY
Status: DISCONTINUED | OUTPATIENT
Start: 2025-03-14 | End: 2025-03-15 | Stop reason: HOSPADM

## 2025-03-14 RX ORDER — SODIUM CHLORIDE, SODIUM LACTATE, POTASSIUM CHLORIDE, CALCIUM CHLORIDE 600; 310; 30; 20 MG/100ML; MG/100ML; MG/100ML; MG/100ML
75 INJECTION, SOLUTION INTRAVENOUS CONTINUOUS
Status: DISCONTINUED | OUTPATIENT
Start: 2025-03-14 | End: 2025-03-15 | Stop reason: HOSPADM

## 2025-03-14 RX ORDER — SODIUM CHLORIDE 9 MG/ML
40 INJECTION, SOLUTION INTRAVENOUS AS NEEDED
Status: DISCONTINUED | OUTPATIENT
Start: 2025-03-14 | End: 2025-03-15 | Stop reason: HOSPADM

## 2025-03-14 RX ORDER — MORPHINE SULFATE 2 MG/ML
2 INJECTION, SOLUTION INTRAMUSCULAR; INTRAVENOUS EVERY 4 HOURS PRN
Status: DISCONTINUED | OUTPATIENT
Start: 2025-03-14 | End: 2025-03-15 | Stop reason: HOSPADM

## 2025-03-14 RX ORDER — IOPAMIDOL 612 MG/ML
100 INJECTION, SOLUTION INTRAVASCULAR
Status: COMPLETED | OUTPATIENT
Start: 2025-03-14 | End: 2025-03-14

## 2025-03-14 RX ORDER — ONDANSETRON 2 MG/ML
4 INJECTION INTRAMUSCULAR; INTRAVENOUS ONCE
Status: COMPLETED | OUTPATIENT
Start: 2025-03-14 | End: 2025-03-14

## 2025-03-14 RX ORDER — SODIUM CHLORIDE 0.9 % (FLUSH) 0.9 %
10 SYRINGE (ML) INJECTION EVERY 12 HOURS SCHEDULED
Status: DISCONTINUED | OUTPATIENT
Start: 2025-03-14 | End: 2025-03-15 | Stop reason: HOSPADM

## 2025-03-14 RX ORDER — KETOROLAC TROMETHAMINE 30 MG/ML
15 INJECTION, SOLUTION INTRAMUSCULAR; INTRAVENOUS ONCE
Status: COMPLETED | OUTPATIENT
Start: 2025-03-14 | End: 2025-03-14

## 2025-03-14 RX ORDER — NITROGLYCERIN 0.4 MG/1
0.4 TABLET SUBLINGUAL
Status: DISCONTINUED | OUTPATIENT
Start: 2025-03-14 | End: 2025-03-15

## 2025-03-14 RX ADMIN — VANCOMYCIN HYDROCHLORIDE 1250 MG: 1.25 INJECTION, POWDER, LYOPHILIZED, FOR SOLUTION INTRAVENOUS at 20:40

## 2025-03-14 RX ADMIN — MORPHINE SULFATE 4 MG: 4 INJECTION, SOLUTION INTRAMUSCULAR; INTRAVENOUS at 17:32

## 2025-03-14 RX ADMIN — IOPAMIDOL 100 ML: 612 INJECTION, SOLUTION INTRAVENOUS at 18:08

## 2025-03-14 RX ADMIN — PIPERACILLIN AND TAZOBACTAM 3.38 G: 3; .375 INJECTION, POWDER, FOR SOLUTION INTRAVENOUS at 20:24

## 2025-03-14 RX ADMIN — ONDANSETRON 4 MG: 2 INJECTION INTRAMUSCULAR; INTRAVENOUS at 17:32

## 2025-03-14 RX ADMIN — ENOXAPARIN SODIUM 30 MG: 100 INJECTION SUBCUTANEOUS at 22:07

## 2025-03-14 RX ADMIN — SODIUM CHLORIDE, POTASSIUM CHLORIDE, SODIUM LACTATE AND CALCIUM CHLORIDE 75 ML/HR: 600; 310; 30; 20 INJECTION, SOLUTION INTRAVENOUS at 20:39

## 2025-03-14 RX ADMIN — DIPHENHYDRAMINE HYDROCHLORIDE 12.5 MG: 50 INJECTION, SOLUTION INTRAMUSCULAR; INTRAVENOUS at 22:07

## 2025-03-14 RX ADMIN — MORPHINE SULFATE 2 MG: 2 INJECTION, SOLUTION INTRAMUSCULAR; INTRAVENOUS at 21:31

## 2025-03-14 RX ADMIN — KETOROLAC TROMETHAMINE 15 MG: 30 INJECTION, SOLUTION INTRAMUSCULAR; INTRAVENOUS at 17:32

## 2025-03-14 NOTE — PROGRESS NOTES
Pharmacokinetic Consult - Zosyn Dosing  Lorene Walker is a 24 y.o. female who has been consulted to dose Zosyn for  Intra-abdominal infection .    Current Antimicrobial Therapy    Anti-Infectives (From admission, onward)      Ordered     Dose/Rate Route Frequency Start Stop    03/14/25 1953  vancomycin (VANCOCIN) 1,000 mg in sodium chloride 0.9 % 250 mL IVPB-VTB        Ordering Provider: Sanju Pollock MD    1,000 mg  250 mL/hr over 60 Minutes Intravenous Every 12 Hours 03/15/25 0700 03/20/25 0659    03/14/25 1953  piperacillin-tazobactam (ZOSYN) IVPB 3.375 g IVPB in 100 mL NS (VTB)        Ordering Provider: Sanju Pollock MD    3.375 g  over 4 Hours Intravenous Every 8 Hours 03/15/25 0100 03/20/25 0059    03/14/25 1953  Vancomycin HCl 1,250 mg in sodium chloride 0.9 % 250 mL VTB        Ordering Provider: Sanju Pollock MD    1,250 mg  200 mL/hr over 75 Minutes Intravenous Once 03/14/25 2009 03/14/25 1846  piperacillin-tazobactam (ZOSYN) IVPB 3.375 g IVPB in 100 mL NS (VTB)        Ordering Provider: Medardo Ramos MD    3.375 g  over 30 Minutes Intravenous Once 03/14/25 1902              Microbiology Results (last 10 days)       ** No results found for the last 240 hours. **             Allergies  Patient has no known allergies.    Relevant clinical data and objective history reviewed:  Creatinine   Date Value Ref Range Status   03/14/2025 0.76 0.57 - 1.00 mg/dL Final     Estimated Creatinine Clearance: 100.5 mL/min (by C-G formula based on SCr of 0.76 mg/dL).  No intake/output data recorded.  Patient weight: 55.3 kg (122 lb)    Asessment/Plan  Initiate Zosyn 3.375g IV every 8 hours  Pharmacy will monitor Ms. Walker's renal function and clinical status and adjust the Zosyn dose and/or frequency as needed.    Thanks,   Irma Ward, PharmD  3/14/2025  19:54 EDT

## 2025-03-14 NOTE — PROGRESS NOTES
"Pharmacy Consult-Vancomycin Dosing    Lorene Walker is a  24 y.o. female receiving vancomycin therapy.     Indication: Sepsis  Consulting Provider: Dr. Pollock    Goal AUC: 400-600 mg/:L*hr    Current Antimicrobial Therapy  Anti-Infectives (From admission, onward)      Ordered     Dose/Rate Route Frequency Start Stop    03/14/25 1846  piperacillin-tazobactam (ZOSYN) IVPB 3.375 g IVPB in 100 mL NS (VTB)        Ordering Provider: Medardo Ramos MD    3.375 g  over 30 Minutes Intravenous Once 03/14/25 1902              Labs  Results from last 7 days   Lab Units 03/14/25  1652   WBC 10*3/mm3 13.31*   CREATININE mg/dL 0.76      Estimated Creatinine Clearance: 100.5 mL/min (by C-G formula based on SCr of 0.76 mg/dL).  Temp Readings from Last 1 Encounters:   03/14/25 98.2 °F (36.8 °C) (Oral)       Microbiology Culture results  Microbiology Results (last 10 days)       ** No results found for the last 240 hours. **            Evaluation of Dosing     Last Dose Received in the ED/Outside Facility: None  Is Patient on Dialysis or Renal Replacement: No    Ht - 165.1 cm (65\")  Wt - 55.3 kg (122 lb)    Evaluation of Level                      [facility] AUC Calculation    Current AUC:   New Start    Predicted Steady State AUC on Current Dose: 505 mg/L*hr  _________________________________    Predicted Steady State AUC on New Dose:   New Start    Assessment/Plan    Pharmacy to dose vancomycin for Sepsis. Goal -600 mg/L*hr.  Patient to receive loading dose of vancomycin 1250mg (~22.6mg/kg) IV on 3/14 @ 2100. Initiate maintenance dose of vancomycin 1000mg (~18.1mg/kg) IV Q12hr on 3/15 @ 0700.  Assess clearance by vancomycin random level on 3/16 @ 0600.  Pharmacy will continue to monitor renal function, cultures and sensitivities, and clinical status to adjust regimen as necessary.      Thank you,  Irma Ward, PharmD  03/14/25 19:47 EDT      "

## 2025-03-14 NOTE — H&P
[facility]    Patient Identification:  Name:  Lorene Walker  Age:  24 y.o.  Sex:  female  :  2000  MRN:  [id number]   Visit Number:  [id number]  Admit Date: 3/14/2025   Room number:    Primary Care Physician:  Helen Castro APRN    Date of Admission: 3/14/2025     Subjective     Chief complaint:    Chief Complaint   Patient presents with    Abdominal Pain       History of presenting illness:    This is a 24-year-old female with no past medical history presenting with abdominal pain.  Patient states that for the last week she has been experiencing worsening abdominal pain.  Patient reports that her pain initially started in the epigastric region but then radiated to her right lower quadrant.  Patient reports that her pain was so severe that she decided to present to our ED.  She denies any nausea, vomiting, fevers or chills.    In ED, blood pressure 127/95, heart rate 111, respiratory of 18, temperature 98.2 and O2 saturation 100% on room air.  Labs on arrival showed sodium 136, potassium 3.3, BUN 8, creatinine 0.67, WBC 13.3, hemoglobin 13.9 and platelet count 288.  Analysis showed a dark urine with negative nitrites and trace leukocytes.  CT abdomen showed acute appendicitis with surrounding loculated peripherally enhancing fluid consistent with ruptured with early abscess formation.    ---------------------------------------------------------------------------------------------------------------------   Review of Systems   Constitutional:  Negative for chills.   HENT:  Negative for dental problem and rhinorrhea.    Eyes:  Negative for photophobia.   Respiratory:  Negative for choking.    Cardiovascular:  Negative for leg swelling.   Gastrointestinal:  Positive for abdominal pain.   Endocrine: Negative for heat intolerance.   Genitourinary:  Negative for decreased urine volume and dyspareunia.   Musculoskeletal:  Negative for gait problem and neck  pain.   Skin:  Negative for pallor.   Allergic/Immunologic: Negative for food allergies.   Neurological:  Negative for headaches.   Psychiatric/Behavioral:  Negative for dysphoric mood.      ---------------------------------------------------------------------------------------------------------------------   Past Medical History:   Diagnosis Date    Anemia 2021    with first pregnancy    Asthma 2009    Hasn't used an inhaler for the last 10 years    Rh incompatibility     received 4 doses of Rhogam in hte first pregnancy     Past Surgical History:   Procedure Laterality Date    TONSILLECTOMY AND ADENOIDECTOMY  2004     History reviewed. No pertinent family history.  Social History     Socioeconomic History    Marital status: Legally      Spouse name: Christophe Santos    Number of children: 1    Years of education: [employer]    Highest education level: Associate degree: occupational, technical, or vocational program   Tobacco Use    Smoking status: Never    Smokeless tobacco: Never   Vaping Use    Vaping status: Never Used   Substance and Sexual Activity    Drug use: Never    Sexual activity: Defer     ---------------------------------------------------------------------------------------------------------------------   Allergies:  Patient has no known allergies.  ---------------------------------------------------------------------------------------------------------------------   Medications below are reported home medications pulling from within the system; at this time, these medications have not been reconciled unless otherwise specified and are in the verification process for further verifcation as current home medications.      Prior to Admission Medications       Prescriptions Last Dose Informant Patient Reported? Taking?    albuterol (PROVENTIL HFA;VENTOLIN HFA) 108 (90 Base) MCG/ACT inhaler   Yes No    Inhale 2 puffs Every 4 (Four) Hours As Needed for Wheezing.    Patient not taking:  Reported on  10/8/2022    azithromycin (ZITHROMAX) 250 MG tablet  Mother No No    Take 2 tablets the first day, then 1 tablet daily for 4 days.    Patient not taking:  Reported on 10/8/2022    benzonatate (TESSALON PERLES) 100 MG capsule  Self No No    Take 1 capsule by mouth 3 (Three) Times a Day As Needed for Cough.    Patient not taking:  Reported on 10/8/2022    calcium carbonate EX (TUMS EX) 750 MG chewable tablet  Self Yes No    Chew 1 tablet Daily.    ibuprofen (ADVIL,MOTRIN) 600 MG tablet  Self No No    Take 1 tablet by mouth Every 8 (Eight) Hours As Needed for Mild Pain .    Patient not taking:  Reported on 10/8/2022    norethindrone-ethinyl estradiol (MICROGESTIN) 1-20 MG-MCG per tablet  Self Yes No    Take 1 tablet by mouth Daily.    ondansetron ODT (ZOFRAN-ODT) 4 MG disintegrating tablet  Self No No    Take 1 tablet by mouth Every 8 (Eight) Hours As Needed for Nausea or Vomiting.    Patient not taking:  Reported on 10/8/2022    ondansetron ODT (ZOFRAN-ODT) 4 MG disintegrating tablet   No No    Place 1 tablet on the tongue 4 (Four) Times a Day As Needed for Nausea.    Prenatal Vit-Fe Fumarate-FA (PRENATAL VITAMIN PO)  Self Yes No    Take 1 tablet by mouth Daily.          Objective     Vital Signs:  Temp:  [98.2 °F (36.8 °C)] 98.2 °F (36.8 °C)  Heart Rate:  [111] 111  Resp:  [18] 18  BP: (127-145)/() 144/110    Mean Arterial Pressure (Non-Invasive) for the past 24 hrs (Last 3 readings):   Noninvasive MAP (mmHg)   03/14/25 1828 118   03/14/25 1738 109     SpO2:  [95 %-100 %] 95 %  on   ;   Device (Oxygen Therapy): room air  Body mass index is 20.3 kg/m².    Wt Readings from Last 3 Encounters:   03/14/25 55.3 kg (122 lb)   10/04/23 54.6 kg (120 lb 6.4 oz)   08/22/23 54.3 kg (119 lb 9.6 oz)      ----------------------------------------------------------------------------------------------------------------------  PHYSICAL EXAMINATION:  GENERAL: The patient is well developed and nontoxic.  HEENT: Anicteric sclerae,  "GLENN BROWNING. Oropharynx clear. Moist mucous membranes. Conjunctivae appear well perfused.  CHEST: Chest wall is nontender.  HEART: Regular rate and rhythm without murmurs.  LUNGS: Clear to auscultation bilaterally.  ABDOMEN: Soft, positive bowel sounds, nontender, no organomegaly.  RECTAL: Deferred.  SKIN: No rash, no excessive bruising, petechiae, or purpura.  NEUROLOGIC: Cranial nerves II-XII intact without motor/sensory deficit.    ---------------------------------------------------------------------------------------------------------------------  --------------------------------------------------------------------------------------------------------------------  LABS:    CBC and coagulation:  Results from last 7 days   Lab Units 03/14/25  1652   WBC 10*3/mm3 13.31*   HEMOGLOBIN g/dL 13.9   HEMATOCRIT % 38.6   MCV fL 86.2   MCHC g/dL 36.0*   PLATELETS 10*3/mm3 288     Acid/base balance:      Renal and electrolytes:  Results from last 7 days   Lab Units 03/14/25  1652   SODIUM mmol/L 136   POTASSIUM mmol/L 3.3*   CHLORIDE mmol/L 99   CO2 mmol/L 23.8   BUN mg/dL 8   CREATININE mg/dL 0.76   CALCIUM mg/dL 9.6   GLUCOSE mg/dL 131*     Estimated Creatinine Clearance: 100.5 mL/min (by C-G formula based on SCr of 0.76 mg/dL).    Liver and pancreatic function:  Results from last 7 days   Lab Units 03/14/25  1652   ALBUMIN g/dL 4.6   BILIRUBIN mg/dL 0.6   ALK PHOS U/L 100   AST (SGOT) U/L 13   ALT (SGPT) U/L 7   LIPASE U/L 22     Endocrine function:  No results found for: \"HGBA1C\"  Point of care bedside glucose levels:      No results found for: \"TSH\", \"FREET4\"  Cardiac:        Cultures:  Lab Results   Component Value Date    COLORU Dark Yellow (A) 03/14/2025    CLARITYU Clear 03/14/2025    SPECGRAV 1.020 10/08/2022    PHUR 6.0 03/14/2025    GLUCOSEU Negative 03/14/2025    KETONESU Trace (A) 03/14/2025    BLOODU Negative 03/14/2025    NITRITEU Negative 03/14/2025    LEUKOCYTESUR Trace (A) 03/14/2025    BILIRUBINUR " Negative 03/14/2025    UROBILINOGEN 1.0 E.U./dL 03/14/2025    RBCUA None Seen 03/14/2025    WBCUA 3-5 (A) 03/14/2025    BACTERIA Trace (A) 03/14/2025     Microbiology Results (last 10 days)       ** No results found for the last 240 hours. **            Lab Results   Component Value Date    PREGTESTUR Negative 03/14/2025     Pain Management Panel          Latest Ref Rng & Units 10/8/2022   Pain Management Panel   Amphetamine, Urine Qual Negative Negative    Barbiturates Screen, Urine Negative Negative    Benzodiazepine Screen, Urine Negative Negative    Buprenorphine, Screen, Urine Negative Negative    Cocaine Screen, Urine Negative Negative    Methadone Screen , Urine Negative Negative    Methamphetamine, Ur Negative Negative        I have personally looked at the labs and they are summarized above.  ----------------------------------------------------------------------------------------------------------------------  Detailed radiology reports for the last 24 hours:    Imaging Results (Last 24 Hours)       Procedure Component Value Units Date/Time    CT Abdomen Pelvis With Contrast [[id number]] Collected: 03/14/25 1843     Updated: 03/14/25 1848    Addenda:        ADDENDUM REPORT    ADDENDUM:  This report was discussed with BRITTA FRIAS MD on Mar 14, 2025   18:46:00 EDT.    Authenticated and Electronically Signed by Nicole Montilla on  03/14/2025 06:46:16 PM  Signed: 03/14/25 1846 by Nicole Montilla MD    Narrative:      FINAL REPORT    TECHNIQUE:  null    CLINICAL HISTORY:  rlq pain    COMPARISON:  null    FINDINGS:  CT abdomen and pelvis with contrast    Comparison: None    Findings:    The lung bases are clear.    Unremarkable gallbladder and solid organs. No urolithiasis.    No bowel obstruction, pneumoperitoneum, or pneumatosis.    Pelvic contents unremarkable. The appendix is markedly distended measuring 17 mm, with mural hyperemia and thickening, as well as moderate surrounding partially loculated  fluid, spanning roughly 55 mm transverse. This fluid demonstrates mild surrounding   enhancement. An appendicolith is present.    No acute fracture.      Impression:      IMPRESSION:    1. Acute appendicitis with surrounding loculated peripherally enhancing fluid, consistent with rupture with early abscess formation.    Authenticated and Electronically Signed by Nicole Montilla on  03/14/2025 06:43:51 PM    US Abdomen Limited [[id number]] Resulted: 03/14/25 1826     Updated: 03/14/25 1847          Final impressions for the last 30 days of radiology reports:    CT Abdomen Pelvis With Contrast  Addendum Date: 3/14/2025  ADDENDUM REPORT ADDENDUM: This report was discussed with BRITTA FRIAS MD on Mar 14, 2025 18:46:00 EDT. Authenticated and Electronically Signed by Nicole Montilla on 03/14/2025 06:46:16 PM    Result Date: 3/14/2025  IMPRESSION: 1. Acute appendicitis with surrounding loculated peripherally enhancing fluid, consistent with rupture with early abscess formation. Authenticated and Electronically Signed by Nicole Montilla on 03/14/2025 06:43:51 PM        Assessment & Plan    This is a 24-year-old female with no past medical history presenting with abdominal pain.  Patient states that for the last week she has been experiencing worsening abdominal pain.      Assessment:  Ruptured appendicitis  Sepsis  Early abscess formation    Plan:   - Admit to inpatient  - Start vancomycin and Zosyn  - Start IV fluids  - Monitor on telemetry  - Monitor lactic acid  - Follow-up blood cultures  - Pain management  - General surgery has been made aware and will see the patient in consult        Sanju Pollock MD  [facility]  03/14/25  19:32 EDT

## 2025-03-15 ENCOUNTER — ANESTHESIA EVENT (OUTPATIENT)
Dept: PERIOP | Facility: HOSPITAL | Age: 25
End: 2025-03-15
Payer: COMMERCIAL

## 2025-03-15 ENCOUNTER — ANESTHESIA (OUTPATIENT)
Dept: PERIOP | Facility: HOSPITAL | Age: 25
End: 2025-03-15
Payer: COMMERCIAL

## 2025-03-15 VITALS
WEIGHT: 119.93 LBS | BODY MASS INDEX: 19.98 KG/M2 | TEMPERATURE: 98.1 F | HEIGHT: 65 IN | DIASTOLIC BLOOD PRESSURE: 79 MMHG | SYSTOLIC BLOOD PRESSURE: 121 MMHG | RESPIRATION RATE: 16 BRPM | HEART RATE: 84 BPM | OXYGEN SATURATION: 99 %

## 2025-03-15 LAB
ANION GAP SERPL CALCULATED.3IONS-SCNC: 10.6 MMOL/L (ref 5–15)
BASOPHILS # BLD AUTO: 0.03 10*3/MM3 (ref 0–0.2)
BASOPHILS NFR BLD AUTO: 0.3 % (ref 0–1.5)
BUN SERPL-MCNC: 11 MG/DL (ref 6–20)
BUN/CREAT SERPL: 19.3 (ref 7–25)
CALCIUM SPEC-SCNC: 8.5 MG/DL (ref 8.6–10.5)
CHLORIDE SERPL-SCNC: 105 MMOL/L (ref 98–107)
CO2 SERPL-SCNC: 24.4 MMOL/L (ref 22–29)
CREAT SERPL-MCNC: 0.57 MG/DL (ref 0.57–1)
D-LACTATE SERPL-SCNC: 0.6 MMOL/L (ref 0.5–2)
DEPRECATED RDW RBC AUTO: 38.1 FL (ref 37–54)
EGFRCR SERPLBLD CKD-EPI 2021: 131.1 ML/MIN/1.73
EOSINOPHIL # BLD AUTO: 0.31 10*3/MM3 (ref 0–0.4)
EOSINOPHIL NFR BLD AUTO: 2.7 % (ref 0.3–6.2)
ERYTHROCYTE [DISTWIDTH] IN BLOOD BY AUTOMATED COUNT: 11.8 % (ref 12.3–15.4)
GLUCOSE SERPL-MCNC: 82 MG/DL (ref 65–99)
HCT VFR BLD AUTO: 34.4 % (ref 34–46.6)
HGB BLD-MCNC: 12.2 G/DL (ref 12–15.9)
IMM GRANULOCYTES # BLD AUTO: 0.04 10*3/MM3 (ref 0–0.05)
IMM GRANULOCYTES NFR BLD AUTO: 0.3 % (ref 0–0.5)
LYMPHOCYTES # BLD AUTO: 2.5 10*3/MM3 (ref 0.7–3.1)
LYMPHOCYTES NFR BLD AUTO: 21.7 % (ref 19.6–45.3)
MCH RBC QN AUTO: 30.9 PG (ref 26.6–33)
MCHC RBC AUTO-ENTMCNC: 35.5 G/DL (ref 31.5–35.7)
MCV RBC AUTO: 87.1 FL (ref 79–97)
MONOCYTES # BLD AUTO: 0.49 10*3/MM3 (ref 0.1–0.9)
MONOCYTES NFR BLD AUTO: 4.2 % (ref 5–12)
MRSA DNA SPEC QL NAA+PROBE: NORMAL
NEUTROPHILS NFR BLD AUTO: 70.8 % (ref 42.7–76)
NEUTROPHILS NFR BLD AUTO: 8.17 10*3/MM3 (ref 1.7–7)
NRBC BLD AUTO-RTO: 0 /100 WBC (ref 0–0.2)
PLATELET # BLD AUTO: 266 10*3/MM3 (ref 140–450)
PMV BLD AUTO: 10.4 FL (ref 6–12)
POTASSIUM SERPL-SCNC: 3.7 MMOL/L (ref 3.5–5.2)
RBC # BLD AUTO: 3.95 10*6/MM3 (ref 3.77–5.28)
SODIUM SERPL-SCNC: 140 MMOL/L (ref 136–145)
WBC NRBC COR # BLD AUTO: 11.54 10*3/MM3 (ref 3.4–10.8)

## 2025-03-15 PROCEDURE — 99222 1ST HOSP IP/OBS MODERATE 55: CPT | Performed by: SURGERY

## 2025-03-15 PROCEDURE — 25010000002 PIPERACILLIN SOD-TAZOBACTAM PER 1 G: Performed by: STUDENT IN AN ORGANIZED HEALTH CARE EDUCATION/TRAINING PROGRAM

## 2025-03-15 PROCEDURE — 44970 LAPAROSCOPY APPENDECTOMY: CPT | Performed by: SURGERY

## 2025-03-15 PROCEDURE — 25010000002 PIPERACILLIN SOD-TAZOBACTAM PER 1 G: Performed by: INTERNAL MEDICINE

## 2025-03-15 PROCEDURE — 25010000002 BUPIVACAINE (PF) 0.5 % SOLUTION: Performed by: SURGERY

## 2025-03-15 PROCEDURE — 25010000002 ONDANSETRON PER 1 MG: Performed by: NURSE ANESTHETIST, CERTIFIED REGISTERED

## 2025-03-15 PROCEDURE — 0DTJ4ZZ RESECTION OF APPENDIX, PERCUTANEOUS ENDOSCOPIC APPROACH: ICD-10-PCS | Performed by: SURGERY

## 2025-03-15 PROCEDURE — 25010000002 ONDANSETRON PER 1 MG: Performed by: INTERNAL MEDICINE

## 2025-03-15 PROCEDURE — 25810000003 SODIUM CHLORIDE 0.9 % SOLUTION: Performed by: NURSE ANESTHETIST, CERTIFIED REGISTERED

## 2025-03-15 PROCEDURE — 25010000002 PROPOFOL 200 MG/20ML EMULSION: Performed by: NURSE ANESTHETIST, CERTIFIED REGISTERED

## 2025-03-15 PROCEDURE — 25010000002 FENTANYL CITRATE (PF) 50 MCG/ML SOLUTION PREFILLED SYRINGE: Performed by: NURSE ANESTHETIST, CERTIFIED REGISTERED

## 2025-03-15 PROCEDURE — 83605 ASSAY OF LACTIC ACID: CPT | Performed by: STUDENT IN AN ORGANIZED HEALTH CARE EDUCATION/TRAINING PROGRAM

## 2025-03-15 PROCEDURE — 25010000002 SUGAMMADEX 500 MG/5ML SOLUTION: Performed by: NURSE ANESTHETIST, CERTIFIED REGISTERED

## 2025-03-15 PROCEDURE — 8E0W4CZ ROBOTIC ASSISTED PROCEDURE OF TRUNK REGION, PERCUTANEOUS ENDOSCOPIC APPROACH: ICD-10-PCS | Performed by: SURGERY

## 2025-03-15 PROCEDURE — 85025 COMPLETE CBC W/AUTO DIFF WBC: CPT | Performed by: STUDENT IN AN ORGANIZED HEALTH CARE EDUCATION/TRAINING PROGRAM

## 2025-03-15 PROCEDURE — 99238 HOSP IP/OBS DSCHRG MGMT 30/<: CPT | Performed by: INTERNAL MEDICINE

## 2025-03-15 PROCEDURE — 25010000002 HYDROMORPHONE PER 4 MG: Performed by: NURSE ANESTHETIST, CERTIFIED REGISTERED

## 2025-03-15 PROCEDURE — 25010000002 DEXAMETHASONE PER 1 MG: Performed by: NURSE ANESTHETIST, CERTIFIED REGISTERED

## 2025-03-15 PROCEDURE — 25010000002 LIDOCAINE HCL (CARDIAC) 100 MG/5ML SOLUTION PREFILLED SYRINGE: Performed by: NURSE ANESTHETIST, CERTIFIED REGISTERED

## 2025-03-15 PROCEDURE — 80048 BASIC METABOLIC PNL TOTAL CA: CPT | Performed by: STUDENT IN AN ORGANIZED HEALTH CARE EDUCATION/TRAINING PROGRAM

## 2025-03-15 DEVICE — SUREFORM 45 RELOAD WHITE
Type: IMPLANTABLE DEVICE | Site: ABDOMEN | Status: FUNCTIONAL
Brand: SUREFORM

## 2025-03-15 DEVICE — CLIP LIG HEMOLOK PA LG 6CT PRP: Type: IMPLANTABLE DEVICE | Site: ABDOMEN | Status: FUNCTIONAL

## 2025-03-15 RX ORDER — BUPIVACAINE HYDROCHLORIDE 5 MG/ML
INJECTION, SOLUTION EPIDURAL; INTRACAUDAL AS NEEDED
Status: DISCONTINUED | OUTPATIENT
Start: 2025-03-15 | End: 2025-03-15 | Stop reason: HOSPADM

## 2025-03-15 RX ORDER — DEXAMETHASONE SODIUM PHOSPHATE 4 MG/ML
INJECTION, SOLUTION INTRA-ARTICULAR; INTRALESIONAL; INTRAMUSCULAR; INTRAVENOUS; SOFT TISSUE AS NEEDED
Status: DISCONTINUED | OUTPATIENT
Start: 2025-03-15 | End: 2025-03-15 | Stop reason: SURG

## 2025-03-15 RX ORDER — LIDOCAINE HCL/PF 100 MG/5ML
SYRINGE (ML) INJECTION AS NEEDED
Status: DISCONTINUED | OUTPATIENT
Start: 2025-03-15 | End: 2025-03-15 | Stop reason: SURG

## 2025-03-15 RX ORDER — ACETAMINOPHEN 325 MG/1
650 TABLET ORAL EVERY 6 HOURS PRN
Status: DISCONTINUED | OUTPATIENT
Start: 2025-03-15 | End: 2025-03-15 | Stop reason: HOSPADM

## 2025-03-15 RX ORDER — ROCURONIUM BROMIDE 50 MG/5 ML
SYRINGE (ML) INTRAVENOUS AS NEEDED
Status: DISCONTINUED | OUTPATIENT
Start: 2025-03-15 | End: 2025-03-15 | Stop reason: SURG

## 2025-03-15 RX ORDER — HYDROCODONE BITARTRATE AND ACETAMINOPHEN 7.5; 325 MG/1; MG/1
1 TABLET ORAL EVERY 6 HOURS PRN
Qty: 15 TABLET | Refills: 0 | Status: SHIPPED | OUTPATIENT
Start: 2025-03-15

## 2025-03-15 RX ORDER — ONDANSETRON 2 MG/ML
4 INJECTION INTRAMUSCULAR; INTRAVENOUS EVERY 6 HOURS PRN
Status: DISCONTINUED | OUTPATIENT
Start: 2025-03-15 | End: 2025-03-15 | Stop reason: HOSPADM

## 2025-03-15 RX ORDER — PROPOFOL 10 MG/ML
INJECTION, EMULSION INTRAVENOUS AS NEEDED
Status: DISCONTINUED | OUTPATIENT
Start: 2025-03-15 | End: 2025-03-15 | Stop reason: SURG

## 2025-03-15 RX ORDER — ONDANSETRON 4 MG/1
4 TABLET, ORALLY DISINTEGRATING ORAL 4 TIMES DAILY PRN
Qty: 20 TABLET | Refills: 0 | Status: SHIPPED | OUTPATIENT
Start: 2025-03-15

## 2025-03-15 RX ORDER — HYDROMORPHONE HYDROCHLORIDE 2 MG/ML
INJECTION, SOLUTION INTRAMUSCULAR; INTRAVENOUS; SUBCUTANEOUS AS NEEDED
Status: DISCONTINUED | OUTPATIENT
Start: 2025-03-15 | End: 2025-03-15 | Stop reason: SURG

## 2025-03-15 RX ORDER — ONDANSETRON 2 MG/ML
INJECTION INTRAMUSCULAR; INTRAVENOUS AS NEEDED
Status: DISCONTINUED | OUTPATIENT
Start: 2025-03-15 | End: 2025-03-15 | Stop reason: SURG

## 2025-03-15 RX ORDER — KETAMINE HCL IN NACL, ISO-OSM 100MG/10ML
SYRINGE (ML) INJECTION AS NEEDED
Status: DISCONTINUED | OUTPATIENT
Start: 2025-03-15 | End: 2025-03-15 | Stop reason: SURG

## 2025-03-15 RX ORDER — SODIUM CHLORIDE 9 MG/ML
INJECTION, SOLUTION INTRAVENOUS CONTINUOUS PRN
Status: DISCONTINUED | OUTPATIENT
Start: 2025-03-15 | End: 2025-03-15 | Stop reason: SURG

## 2025-03-15 RX ORDER — FENTANYL CITRATE 50 UG/ML
INJECTION, SOLUTION INTRAMUSCULAR; INTRAVENOUS AS NEEDED
Status: DISCONTINUED | OUTPATIENT
Start: 2025-03-15 | End: 2025-03-15 | Stop reason: SURG

## 2025-03-15 RX ADMIN — Medication 10 ML: at 07:43

## 2025-03-15 RX ADMIN — HYDROMORPHONE HYDROCHLORIDE 1 MG: 2 INJECTION INTRAMUSCULAR; INTRAVENOUS; SUBCUTANEOUS at 09:19

## 2025-03-15 RX ADMIN — SODIUM CHLORIDE: 9 INJECTION, SOLUTION INTRAVENOUS at 08:16

## 2025-03-15 RX ADMIN — PIPERACILLIN AND TAZOBACTAM 3.38 G: 3; .375 INJECTION, POWDER, FOR SOLUTION INTRAVENOUS at 07:43

## 2025-03-15 RX ADMIN — PROPOFOL 170 MG: 10 INJECTION, EMULSION INTRAVENOUS at 08:18

## 2025-03-15 RX ADMIN — Medication 50 MG: at 08:19

## 2025-03-15 RX ADMIN — Medication 25 MG: at 08:16

## 2025-03-15 RX ADMIN — HYDROMORPHONE HYDROCHLORIDE 1 MG: 2 INJECTION INTRAMUSCULAR; INTRAVENOUS; SUBCUTANEOUS at 09:09

## 2025-03-15 RX ADMIN — PIPERACILLIN AND TAZOBACTAM 3.38 G: 3; .375 INJECTION, POWDER, FOR SOLUTION INTRAVENOUS at 00:17

## 2025-03-15 RX ADMIN — PIPERACILLIN AND TAZOBACTAM 3.38 G: 3; .375 INJECTION, POWDER, FOR SOLUTION INTRAVENOUS at 13:06

## 2025-03-15 RX ADMIN — DEXAMETHASONE SODIUM PHOSPHATE 4 MG: 4 INJECTION, SOLUTION INTRA-ARTICULAR; INTRALESIONAL; INTRAMUSCULAR; INTRAVENOUS; SOFT TISSUE at 08:47

## 2025-03-15 RX ADMIN — ONDANSETRON 4 MG: 2 INJECTION INTRAMUSCULAR; INTRAVENOUS at 11:14

## 2025-03-15 RX ADMIN — LIDOCAINE HYDROCHLORIDE 50 MG: 20 INJECTION INTRAVENOUS at 08:18

## 2025-03-15 RX ADMIN — ONDANSETRON 4 MG: 2 INJECTION INTRAMUSCULAR; INTRAVENOUS at 08:47

## 2025-03-15 RX ADMIN — SUGAMMADEX 400 MG: 100 INJECTION, SOLUTION INTRAVENOUS at 09:12

## 2025-03-15 RX ADMIN — FENTANYL CITRATE 50 MCG: 50 INJECTION, SOLUTION INTRAMUSCULAR; INTRAVENOUS at 08:18

## 2025-03-15 NOTE — DISCHARGE INSTRUCTIONS
You may remove the gauze dressings in 24 hours. Please leave the steri-strips in place until they come off on their own

## 2025-03-15 NOTE — ANESTHESIA PROCEDURE NOTES
Airway  Reason: elective    Date/Time: 3/15/2025 8:16 AM  Airway not difficult    General Information and Staff    Patient location during procedure: OR  CRNA/CAA: Onur Mercado CRNA    Indications and Patient Condition  Indications for airway management: airway protection    Preoxygenated: yes  MILS maintained throughout    Mask difficulty assessment: 0 - not attempted    Final Airway Details    Final airway type: endotracheal airway      Successful airway: ETT  Cuffed: yes   Successful intubation technique: direct laryngoscopy  Endotracheal tube insertion site: oral  Blade: Hare  Blade size: 2  ETT size (mm): 7.0  Cormack-Lehane Classification: grade I - full view of glottis  Placement verified by: chest auscultation and capnometry   Number of attempts at approach: 1  Assessment: lips, teeth, and gum same as pre-op and atraumatic intubation

## 2025-03-15 NOTE — OP NOTE
PATIENT:    Lorene Walker    DATE OF SURGERY:    3/15/2025    PHYSICIAN:    Efraín Danielle MD    REFERRING PHYSICIAN:  Helen Castro APRN    YOB: 2000    PREOPERATIVE DIAGNOSIS:  Acute appendicitis.    POSTOPERATIVE DIAGNOSIS:  Acute appendicitis.    PROCEDURE:  Robotic assisted laparoscopic appendectomy.    EBL:  Less than 50 cc    COMPLICATIONS:  None    OPERATIVE PROCEDURE:  The patient was taken to the operating room in the normal manner and given general endotracheal anesthesia.  The patient was also given preoperative IV antibiotics.  I did discuss the situation with the patient preoperatively and I marked them accordingly.  An appropriate timeout was performed intraoperatively prior to the incision.      A left paraumbilical incision slightly above and to the left of the umbilicus was made to insert a Veress needle to insufflate the abdomen with CO2.  A 8 mm Optiview was inserted here and good visualization was obtained.  A separate left lower quadrant 8 mm Optiview was inserted under direct vision along with the left upper quadrant 12 mm Optiview.      The appendix was found to be in the right lower quadrant and was dissected free.  It was grasped with graspers without teeth.  The appendiceal mesentery was dissected free and then divided with  a robotic GI stapling device as was the base of the appendix itself.  Vessel seal device was also used on the appendiceal mesentery.  The appendix was placed in the Endobag and removed via the left upper quadrant 12 mm port site.    Bovie electrocautery was used very sparingly on the appendiceal mesentery for further hemostasis.  The abdomen was clean and dry at this point in time.  All trocars were injected with Marcaine for postoperative anesthetic and then removed under direct visualization.  0-Vicryl suture was used to close the fascia and 4-0 subcuticular stitch and Steri-Strips were used to close the skin.  The patient was stable  at this point in time and subsequently transferred back to the recovery room in stable condition.    PLAN:  I did have a detailed discussion with the patient's fidee Vizcarra [phone] with regard to the findings at the time of operative intervention, the patient's current condition, and the postoperative treatment plan.  They had no questions for me at the end of the discussion.      Efraín Danielle MD  3/15/2025  09:25 EDT

## 2025-03-15 NOTE — PLAN OF CARE
Goal Outcome Evaluation:  Plan of Care Reviewed With: patient        Progress: no change  Outcome Evaluation: pt appeared to have reaction to vanc overnight-redness and itching-MD notified and vanc DC'd and benadryl given per order                             Problem: Adult Inpatient Plan of Care  Goal: Plan of Care Review  3/15/2025 0623 by Danni Moore, RN  Outcome: Progressing  Flowsheets (Taken 3/15/2025 0623)  Outcome Evaluation: pt appeared to have reaction to vanc overnight-redness and itching-MD notified and vanc DC'd and benadryl given per order  3/15/2025 0622 by Danni Moore, RN  Outcome: Progressing  Flowsheets (Taken 3/15/2025 0622)  Progress: no change  Outcome Evaluation:   VSS   continue antibiotics and ivf   plan for surgery about 0800   monitor pain  Plan of Care Reviewed With: patient

## 2025-03-15 NOTE — DISCHARGE SUMMARY
[facility]   DISCHARGE SUMMARY      Name:  Lorene Walker   Age:  24 y.o.  Sex:  female  :  2000  MRN:  [id number]   Visit Number:  [id number]    Admission Date:  3/14/2025  Date of Discharge:  3/15/2025  Primary Care Physician:  Helen Castro APRN    Important issues to note:    1.  Patient was admitted with abdominal pain and appendicitis.  She did undergo appendectomy by Dr. Danielle and is currently being discharged home on Lortab and Zofran.  2.  Follow-up with Dr. Danielle in 2 weeks.  3.  Follow-up with primary care provider in 1 week.    Discharge Diagnoses:     1.  Acute appendicitis, status post laparoscopic appendectomy on 3/15/2025.  2.  Mild hypokalemia, POA, resolved.    Problem List:     Active Hospital Problems    Diagnosis  POA    **Ruptured appendicitis [K35.32]  Yes      Resolved Hospital Problems   No resolved problems to display.     Presenting Problem:    Chief Complaint   Patient presents with    Abdominal Pain      Consults:     Consulting Physician(s)         Provider   Role Specialty     Efraín Danielle MD      Consulting Physician General Surgery          Procedures Performed:    APPENDECTOMY LAPAROSCOPIC WITH DAVINCI ROBOT on 3/15/2025.    History of presenting illness/Hospital Course:    Lorene Walker is a 24-year-old female with no past medical history, not taking any medications at home except for birth control pills, presenting with abdominal pain.  Patient states that for the last week she has been experiencing worsening abdominal pain.  Patient reports that her pain initially started in the epigastric region but then radiated to her right lower quadrant.  Patient reports that her pain was so severe that she decided to present to our ED.  She denies any nausea, vomiting, fevers or chills.     In ED, blood pressure 127/95, heart rate 111, respiratory of 18, temperature 98.2 and O2 saturation 100% on room air.  Labs on arrival  showed sodium 136, potassium 3.3, BUN 8, creatinine 0.67, WBC 13.3, hemoglobin 13.9 and platelet count 288.  Analysis showed a dark urine with negative nitrites and trace leukocytes.  CT abdomen showed acute appendicitis with surrounding loculated peripherally enhancing fluid consistent with ruptured with early abscess formation.  Patient's condition was discussed with Dr. Danielle from surgery and the patient was admitted to the hospitalist service.    Patient was initiated on Zosyn, IV fluids and pain control.  She was seen by Dr. Danielle next day and did undergo laparoscopic robotic assisted appendectomy.  There was no evidence of ruptured appendix on intraoperative evaluation.  Patient tolerated the procedure well and is currently able to ambulate and tolerate diet.  She is being discharged home with antiemetics and Lortab for pain control.  I have discussed the patient's treatment plan with Dr. Danielle and he did not feel that the patient needed antibiotics at home but recommended to give a dose of Zosyn prior to discharge.  I have discussed the patient's condition and discharge plan with her fiancé who is at the bedside.  Discussed with nursing staff.    Vital Signs:    Temp:  [97.9 °F (36.6 °C)-99.2 °F (37.3 °C)] 98.1 °F (36.7 °C)  Heart Rate:  [] 84  Resp:  [11-20] 16  BP: ()/() 121/79    Physical Exam:    General Appearance:  Alert and cooperative.    Head:  Atraumatic and normocephalic.   Eyes: Conjunctivae and sclerae normal, no icterus. No pallor.   Ears:  Ears with no abnormalities noted.   Throat: No oral lesions, no thrush, oral mucosa moist.   Neck: Supple, trachea midline, no thyromegaly.   Back:   No kyphoscoliosis present. No tenderness to palpation.   Lungs:   Breath sounds heard bilaterally equally.  No crackles or wheezing. No Pleural rub or bronchial breathing.   Heart:  Normal S1 and S2, no murmur, no gallop, no rub. No JVD.   Abdomen:   Decreased bowel sounds, no masses, no  organomegaly. Soft, diffuse minimal tenderness noted on palpation, nondistended, no rebound tenderness.  Surgical Steri-Strips noted on the abdomen.   Extremities: Supple, no edema, no cyanosis, no clubbing.   Pulses: Pulses palpable bilaterally.   Skin: No bleeding or rash.   Neurologic: Alert and oriented x 3. No facial asymmetry. Moves all four limbs. No tremors.     Pertinent Lab Results:     Results from last 7 days   Lab Units 03/15/25  0751 03/14/25  1652   SODIUM mmol/L 140 136   POTASSIUM mmol/L 3.7 3.3*   CHLORIDE mmol/L 105 99   CO2 mmol/L 24.4 23.8   BUN mg/dL 11 8   CREATININE mg/dL 0.57 0.76   CALCIUM mg/dL 8.5* 9.6   BILIRUBIN mg/dL  --  0.6   ALK PHOS U/L  --  100   ALT (SGPT) U/L  --  7   AST (SGOT) U/L  --  13   GLUCOSE mg/dL 82 131*     Results from last 7 days   Lab Units 03/15/25  0751 03/14/25  1652   WBC 10*3/mm3 11.54* 13.31*   HEMOGLOBIN g/dL 12.2 13.9   HEMATOCRIT % 34.4 38.6   PLATELETS 10*3/mm3 266 288       Results from last 7 days   Lab Units 03/14/25  1652   LIPASE U/L 22     Pertinent Radiology Results:    Imaging Results (All)       Procedure Component Value Units Date/Time    US Abdomen Limited [[id number]] Collected: 03/14/25 2107     Updated: 03/14/25 2109    Narrative:      FINAL REPORT    TECHNIQUE:  null    CLINICAL HISTORY:  ruq pain    COMPARISON:  null    FINDINGS:  Exam: Limited right upper quadrant abdominal ultrasound    Comparison: None    Clinical History: Right upper quadrant pain    Findings:    Selected images from a right upper quadrant ultrasound are provided for interpretation    Normal liver echotexture. No liver lesions are seen.    No gallstones within the gallbladder.    Gallbladder wall is not thickened at 1.4 mm.    No pericholecystic fluid.    Indeterminate sonographic Johnson sign    The common bile duct is not enlarged at 3.0 mm.    No choledocholithiasis.    The visualized pancreas has a normal appearance.    Right kidney does not demonstrate any  hydronephrosis or stones.      Impression:      IMPRESSION:    1. No gallstones. No gallbladder wall thickening to suggest acute cholecystitis.    Authenticated and Electronically Signed by Charmaine Olguin MD  on 03/14/2025 09:07:14 PM    CT Abdomen Pelvis With Contrast [[id number]] Collected: 03/14/25 1843     Updated: 03/14/25 1848    Addenda:        ADDENDUM REPORT    ADDENDUM:  This report was discussed with BRITTA FRIAS MD on Mar 14, 2025   18:46:00 EDT.    Authenticated and Electronically Signed by Nicole Montilla on  03/14/2025 06:46:16 PM  Signed: 03/14/25 1846 by Nicole Montilla MD    Narrative:      FINAL REPORT    TECHNIQUE:  null    CLINICAL HISTORY:  rlq pain    COMPARISON:  null    FINDINGS:  CT abdomen and pelvis with contrast    Comparison: None    Findings:    The lung bases are clear.    Unremarkable gallbladder and solid organs. No urolithiasis.    No bowel obstruction, pneumoperitoneum, or pneumatosis.    Pelvic contents unremarkable. The appendix is markedly distended measuring 17 mm, with mural hyperemia and thickening, as well as moderate surrounding partially loculated fluid, spanning roughly 55 mm transverse. This fluid demonstrates mild surrounding   enhancement. An appendicolith is present.    No acute fracture.      Impression:      IMPRESSION:    1. Acute appendicitis with surrounding loculated peripherally enhancing fluid, consistent with rupture with early abscess formation.    Authenticated and Electronically Signed by Nicole Montilla on  03/14/2025 06:43:51 PM     Condition on Discharge:      Stable.    Code status during the hospital stay:    Code Status and Medical Interventions: CPR (Attempt to Resuscitate); Full Support   Ordered at: 03/14/25 1934     Code Status (Patient has no pulse and is not breathing):    CPR (Attempt to Resuscitate)     Medical Interventions (Patient has pulse or is breathing):    Full Support     Discharge Disposition:    Home or Self  Care    Discharge Medications:       Discharge Medications        New Medications        Instructions Start Date   HYDROcodone-acetaminophen 7.5-325 MG per tablet  Commonly known as: NORCO   1 tablet, Oral, Every 6 Hours PRN             Changes to Medications        Instructions Start Date   ondansetron ODT 4 MG disintegrating tablet  Commonly known as: ZOFRAN-ODT  What changed: Another medication with the same name was removed. Continue taking this medication, and follow the directions you see here.   4 mg, Translingual, 4 Times Daily PRN             Continue These Medications        Instructions Start Date   MICROGESTIN 1-20 MG-MCG per tablet  Generic drug: norethindrone-ethinyl estradiol   1 tablet, Oral, Daily             Stop These Medications      azithromycin 250 MG tablet  Commonly known as: ZITHROMAX     benzonatate 100 MG capsule  Commonly known as: Tessalon Perles     calcium carbonate  MG chewable tablet  Commonly known as: TUMS EX     PITA FE 1/20 PO     ibuprofen 600 MG tablet  Commonly known as: ADVIL,MOTRIN     PRENATAL VITAMIN PO            Discharge Diet:     Diet Instructions       Diet: Gastrointestinal Diets; Fiber-Restricted, Low Irritant; Regular (IDDSI 7); Thin (IDDSI 0)      Discharge Diet: Gastrointestinal Diets    Gastrointestinal Diet:  Fiber-Restricted  Low Irritant       Texture: Regular (IDDSI 7)    Fluid Consistency: Thin (IDDSI 0)          Activity at Discharge:     Activity Instructions       Activity as Tolerated      1) No driving on pain medications  2) No heavy lifting  3) No strenuous activity    Activity as Tolerated            Follow-up Appointments:     Follow-up Information       Helen Castro APRN Follow up in 1 week(s).    Specialty: Family Medicine  Contact information:  [facility] [address] [phone]               Efraín Danielle MD Follow up in 2 week(s).    Specialty: General Surgery  Why: I will have my office contact patient early next  week for followup appt  Contact information:  [address]  [address]  [place] [address]  [phone]                           Test Results Pending at Discharge:    Pending Results       Procedure [Order ID] Specimen - Date/Time    Blood Culture - Blood, Arm, Left [[id number]] Collected: 03/14/25 1954    Specimen: Blood from Arm, Left Updated: 03/14/25 1959    Blood Culture - Blood, Arm, Right [[id number]] Collected: 03/14/25 1954    Specimen: Blood from Arm, Right Updated: 03/14/25 2000    TISSUE EXAM, [facility]) [[id number]] Collected: 03/15/25 0944    Specimen: Tissue from Large Intestine, Appendix                Imtiaz Pruett MD  03/15/25  12:05 EDT    Time: I spent 25 minutes on this discharge activity which included: face-to-face encounter with the patient, reviewing the data in the system, coordination of the care with the nursing staff as well as consultants, documentation, and entering orders.     Dictated utilizing Dragon dictation.

## 2025-03-15 NOTE — ANESTHESIA POSTPROCEDURE EVALUATION
Patient: Lorene Walker    Procedure Summary       Date: 03/15/25 Room / Location: [facility] OR 2 /  [facility] OR    Anesthesia Start: 0813 Anesthesia Stop: 0942    Procedure: APPENDECTOMY LAPAROSCOPIC WITH DAVINCI ROBOT (Abdomen) Diagnosis:     Surgeons: Efraín Danielle MD Provider: Onur Mercado CRNA    Anesthesia Type: general ASA Status: 2 - Emergent            Anesthesia Type: general    Vitals  Vitals Value Taken Time   BP     Temp     Pulse 87 03/15/25 09:42   Resp     SpO2 98 % 03/15/25 09:42   Vitals shown include unfiled device data.        Post Anesthesia Care and Evaluation    Patient location during evaluation: PACU  Patient participation: complete - patient participated  Level of consciousness: awake and alert and sleepy but conscious  Pain score: 2  Pain management: adequate    Airway patency: patent  Anesthetic complications: No anesthetic complications  PONV Status: none  Cardiovascular status: acceptable  Respiratory status: acceptable and face mask  Hydration status: acceptable

## 2025-03-15 NOTE — ED PROVIDER NOTES
EMERGENCY DEPARTMENT ENCOUNTER    Pt Name: Lorene Walker  MRN: [id number]  Pt :   2000  Room Number:  LEAH/LEAH  Date of encounter:  3/14/2025  PCP: Helen Castro APRN  ED Provider: Medardo Ramos MD    Historian: Patient      HPI:  Chief Complaint   Patient presents with    Abdominal Pain          Context: Lorene Walker is a 24 y.o. female who presents to the ED c/o abdominal pain, located lower to the abdomen, present for 4 to 5 days, COVID nausea, severe, has gotten progressively worse.  Saw her physician today who was concerned about appendicitis and sent her to the ER.  Patient denies abdominal surgery history.      PAST MEDICAL HISTORY  Past Medical History:   Diagnosis Date    Anemia     with first pregnancy    Asthma 2009    Hasn't used an inhaler for the last 10 years    Rh incompatibility     received 4 doses of Rhogam in hte first pregnancy         PAST SURGICAL HISTORY  Past Surgical History:   Procedure Laterality Date    TONSILLECTOMY AND ADENOIDECTOMY           FAMILY HISTORY  History reviewed. No pertinent family history.      SOCIAL HISTORY  Social History     Socioeconomic History    Marital status: Legally      Spouse name: Christophe Santos    Number of children: 1    Years of education: [employer]    Highest education level: Associate degree: occupational, technical, or vocational program   Tobacco Use    Smoking status: Never    Smokeless tobacco: Never   Vaping Use    Vaping status: Never Used   Substance and Sexual Activity    Drug use: Never    Sexual activity: Defer         ALLERGIES  Patient has no known allergies.        REVIEW OF SYSTEMS  Review of Systems   Constitutional:  Negative for chills and fever.   HENT:  Negative for sore throat and trouble swallowing.    Eyes:  Negative for pain and redness.   Respiratory:  Negative for cough and shortness of breath.    Cardiovascular:  Negative for chest pain and leg swelling.    Gastrointestinal:  Positive for abdominal pain and nausea. Negative for vomiting.   Genitourinary:  Negative for dysuria and urgency.   Musculoskeletal:  Negative for back pain and neck pain.   Skin:  Negative for rash and wound.   Neurological:  Negative for dizziness and weakness.        All systems reviewed and negative except for those discussed in HPI.       PHYSICAL EXAM    I have reviewed the triage vital signs and nursing notes.    ED Triage Vitals [03/14/25 1630]   Temp Heart Rate Resp BP SpO2   98.2 °F (36.8 °C) 111 18 127/95 100 %      Temp src Heart Rate Source Patient Position BP Location FiO2 (%)   Oral Monitor Sitting Left arm --       Physical Exam  Constitutional:       Appearance: Normal appearance. She is not ill-appearing.   HENT:      Head: Normocephalic and atraumatic.      Right Ear: External ear normal.      Left Ear: External ear normal.      Nose: Nose normal.      Mouth/Throat:      Mouth: Mucous membranes are moist.      Pharynx: Oropharynx is clear.   Eyes:      Extraocular Movements: Extraocular movements intact.      Conjunctiva/sclera: Conjunctivae normal.      Pupils: Pupils are equal, round, and reactive to light.   Cardiovascular:      Rate and Rhythm: Normal rate and regular rhythm.      Pulses:           Radial pulses are 2+ on the right side and 2+ on the left side.   Pulmonary:      Effort: Pulmonary effort is normal.      Breath sounds: Normal breath sounds.   Abdominal:      General: There is no distension.      Palpations: Abdomen is soft.      Tenderness: There is abdominal tenderness in the right lower quadrant.      Comments: Peritonitis   Musculoskeletal:         General: No tenderness or deformity. Normal range of motion.      Cervical back: Normal range of motion and neck supple.      Right lower leg: No edema.      Left lower leg: No edema.   Skin:     General: Skin is warm and dry.      Capillary Refill: Capillary refill takes less than 2 seconds.   Neurological:       General: No focal deficit present.      Mental Status: She is alert and oriented to person, place, and time.            LAB RESULTS  Recent Results (from the past 24 hours)   Comprehensive Metabolic Panel    Collection Time: 03/14/25  4:52 PM    Specimen: Blood   Result Value Ref Range    Glucose 131 (H) 65 - 99 mg/dL    BUN 8 6 - 20 mg/dL    Creatinine 0.76 0.57 - 1.00 mg/dL    Sodium 136 136 - 145 mmol/L    Potassium 3.3 (L) 3.5 - 5.2 mmol/L    Chloride 99 98 - 107 mmol/L    CO2 23.8 22.0 - 29.0 mmol/L    Calcium 9.6 8.6 - 10.5 mg/dL    Total Protein 7.6 6.0 - 8.5 g/dL    Albumin 4.6 3.5 - 5.2 g/dL    ALT (SGPT) 7 1 - 33 U/L    AST (SGOT) 13 1 - 32 U/L    Alkaline Phosphatase 100 39 - 117 U/L    Total Bilirubin 0.6 0.0 - 1.2 mg/dL    Globulin 3.0 gm/dL    A/G Ratio 1.5 g/dL    BUN/Creatinine Ratio 10.5 7.0 - 25.0    Anion Gap 13.2 5.0 - 15.0 mmol/L    eGFR 113.1 >60.0 mL/min/1.73   Lipase    Collection Time: 03/14/25  4:52 PM    Specimen: Blood   Result Value Ref Range    Lipase 22 13 - 60 U/L   Green Top (Gel)    Collection Time: 03/14/25  4:52 PM   Result Value Ref Range    Extra Tube Hold for add-ons.    Lavender Top    Collection Time: 03/14/25  4:52 PM   Result Value Ref Range    Extra Tube hold for add-on    Gold Top - SST    Collection Time: 03/14/25  4:52 PM   Result Value Ref Range    Extra Tube Hold for add-ons.    Light Blue Top    Collection Time: 03/14/25  4:52 PM   Result Value Ref Range    Extra Tube Hold for add-ons.    CBC Auto Differential    Collection Time: 03/14/25  4:52 PM    Specimen: Blood   Result Value Ref Range    WBC 13.31 (H) 3.40 - 10.80 10*3/mm3    RBC 4.48 3.77 - 5.28 10*6/mm3    Hemoglobin 13.9 12.0 - 15.9 g/dL    Hematocrit 38.6 34.0 - 46.6 %    MCV 86.2 79.0 - 97.0 fL    MCH 31.0 26.6 - 33.0 pg    MCHC 36.0 (H) 31.5 - 35.7 g/dL    RDW 11.9 (L) 12.3 - 15.4 %    RDW-SD 37.6 37.0 - 54.0 fl    MPV 10.3 6.0 - 12.0 fL    Platelets 288 140 - 450 10*3/mm3    Neutrophil % 80.0 (H)  42.7 - 76.0 %    Lymphocyte % 13.7 (L) 19.6 - 45.3 %    Monocyte % 4.1 (L) 5.0 - 12.0 %    Eosinophil % 1.7 0.3 - 6.2 %    Basophil % 0.2 0.0 - 1.5 %    Immature Grans % 0.3 0.0 - 0.5 %    Neutrophils, Absolute 10.64 (H) 1.70 - 7.00 10*3/mm3    Lymphocytes, Absolute 1.83 0.70 - 3.10 10*3/mm3    Monocytes, Absolute 0.55 0.10 - 0.90 10*3/mm3    Eosinophils, Absolute 0.22 0.00 - 0.40 10*3/mm3    Basophils, Absolute 0.03 0.00 - 0.20 10*3/mm3    Immature Grans, Absolute 0.04 0.00 - 0.05 10*3/mm3    nRBC 0.0 0.0 - 0.2 /100 WBC   Urinalysis With Microscopic If Indicated (No Culture) - Urine, Clean Catch    Collection Time: 03/14/25  5:12 PM    Specimen: Urine, Clean Catch   Result Value Ref Range    Color, UA Dark Yellow (A) Yellow, Straw    Appearance, UA Clear Clear    pH, UA 6.0 5.0 - 8.0    Specific Gravity, UA 1.026 1.005 - 1.030    Glucose, UA Negative Negative    Ketones, UA Trace (A) Negative    Bilirubin, UA Negative Negative    Blood, UA Negative Negative    Protein, UA Trace (A) Negative    Leuk Esterase, UA Trace (A) Negative    Nitrite, UA Negative Negative    Urobilinogen, UA 1.0 E.U./dL 0.2 - 1.0 E.U./dL   Pregnancy, Urine - Urine, Clean Catch    Collection Time: 03/14/25  5:12 PM    Specimen: Urine, Clean Catch   Result Value Ref Range    HCG, Urine QL Negative Negative   Urinalysis, Microscopic Only - Urine, Clean Catch    Collection Time: 03/14/25  5:12 PM    Specimen: Urine, Clean Catch   Result Value Ref Range    RBC, UA None Seen None Seen, 0-2 /HPF    WBC, UA 3-5 (A) None Seen, 0-2 /HPF    Bacteria, UA Trace (A) None Seen /HPF    Squamous Epithelial Cells, UA 7-12 (A) None Seen, 0-2 /HPF    Hyaline Casts, UA Unable to determine due to loaded field None Seen /LPF    Mucus, UA Moderate/2+ (A) None Seen, Trace /HPF    Methodology Manual Light Microscopy    Lactic Acid, Plasma    Collection Time: 03/14/25  8:43 PM    Specimen: Blood   Result Value Ref Range    Lactate 0.8 0.5 - 2.0 mmol/L       If labs  were ordered, I independently reviewed the results and considered them in treating the patient.        RADIOLOGY  US Abdomen Limited  Result Date: 3/14/2025  FINAL REPORT TECHNIQUE: null CLINICAL HISTORY: ruq pain COMPARISON: null FINDINGS: Exam: Limited right upper quadrant abdominal ultrasound Comparison: None Clinical History: Right upper quadrant pain Findings: Selected images from a right upper quadrant ultrasound are provided for interpretation Normal liver echotexture. No liver lesions are seen. No gallstones within the gallbladder. Gallbladder wall is not thickened at 1.4 mm. No pericholecystic fluid. Indeterminate sonographic Johnson sign The common bile duct is not enlarged at 3.0 mm. No choledocholithiasis. The visualized pancreas has a normal appearance. Right kidney does not demonstrate any hydronephrosis or stones.     IMPRESSION: 1. No gallstones. No gallbladder wall thickening to suggest acute cholecystitis. Authenticated and Electronically Signed by Charmaine Olguin MD on 03/14/2025 09:07:14 PM    CT Abdomen Pelvis With Contrast  Addendum Date: 3/14/2025  ADDENDUM REPORT ADDENDUM: This report was discussed with BRITTA FRIAS MD on Mar 14, 2025 18:46:00 EDT. Authenticated and Electronically Signed by Nicole Montilla on 03/14/2025 06:46:16 PM    Result Date: 3/14/2025  FINAL REPORT TECHNIQUE: null CLINICAL HISTORY: rlq pain COMPARISON: null FINDINGS: CT abdomen and pelvis with contrast Comparison: None Findings: The lung bases are clear. Unremarkable gallbladder and solid organs. No urolithiasis. No bowel obstruction, pneumoperitoneum, or pneumatosis. Pelvic contents unremarkable. The appendix is markedly distended measuring 17 mm, with mural hyperemia and thickening, as well as moderate surrounding partially loculated fluid, spanning roughly 55 mm transverse. This fluid demonstrates mild surrounding enhancement. An appendicolith is present. No acute fracture.     IMPRESSION: 1. Acute  appendicitis with surrounding loculated peripherally enhancing fluid, consistent with rupture with early abscess formation. Authenticated and Electronically Signed by Nicole Montilla on 03/14/2025 06:43:51 PM          PROCEDURES    Procedures    Interpretations    O2 Sat: The patient's oxygen saturation was 96% on Room Air.  This was independently interpreted by me as Normal      Radiology: I ordered and independently reviewed the above noted radiographic studies.  I viewed images of CT Abdomen/Pelvis and US Abdomen which showed  appendicitis  per my independent interpretation. See radiologist's dictation for official interpretation.         MEDICATIONS GIVEN IN ER    Medications   sodium chloride 0.9 % flush 10 mL (has no administration in time range)   lactated ringers infusion (75 mL/hr Intravenous New Bag 3/14/25 2039)   sodium chloride 0.9 % flush 10 mL (has no administration in time range)   sodium chloride 0.9 % flush 10 mL (has no administration in time range)   sodium chloride 0.9 % infusion 40 mL (has no administration in time range)   Pharmacy to Dose enoxaparin (LOVENOX) (has no administration in time range)   nitroglycerin (NITROSTAT) SL tablet 0.4 mg (has no administration in time range)   Pharmacy Consult - Pharmacy to dose (has no administration in time range)   Morphine sulfate (PF) injection 2 mg (has no administration in time range)   Vancomycin HCl 1,250 mg in sodium chloride 0.9 % 250 mL VTB (1,250 mg Intravenous New Bag 3/14/25 2040)   vancomycin (VANCOCIN) 1,000 mg in sodium chloride 0.9 % 250 mL IVPB-VTB (has no administration in time range)   piperacillin-tazobactam (ZOSYN) IVPB 3.375 g IVPB in 100 mL NS (VTB) (has no administration in time range)   morphine injection 4 mg (4 mg Intravenous Given 3/14/25 1732)   ondansetron (ZOFRAN) injection 4 mg (4 mg Intravenous Given 3/14/25 1732)   ketorolac (TORADOL) injection 15 mg (15 mg Intravenous Given 3/14/25 1732)   iopamidol (ISOVUE-300) 61 %  injection 100 mL (100 mL Intravenous Given 3/14/25 1808)   piperacillin-tazobactam (ZOSYN) IVPB 3.375 g IVPB in 100 mL NS (VTB) (3.375 g Intravenous New Bag 3/14/25 2024)         MEDICAL DECISION MAKING, PROGRESS, and CONSULTS    All labs, if obtained, have been independently reviewed by me.  All radiology studies, if obtained, have been reviewed by me and the radiologist dictating the report.  All EKG's, if obtained, have been independently viewed and interpreted by me      Discussion below represents my analysis of pertinent findings related to patient's condition, differential diagnosis, treatment plan and final disposition.      Differential diagnosis:    24-year-old female presented ED with complaint of abdominal pain, she is very tender on the right side of the abdomen, concern for cholecystitis, versus appendicitis, versus GYN process such as ectopic pregnancy or ovarian cyst.  Will obtain CT scan abdomen pelvis, laboratory workup, provide IV morphine, IV Zofran    Additional Sources:  External (non-ED) record review:  OB/GYN note 2/26/2024       Orders placed during this visit:  Orders Placed This Encounter   Procedures    Blood Culture - Blood,    Blood Culture - Blood,    MRSA Screen, PCR (Inpatient) - Swab, Nares    CT Abdomen Pelvis With Contrast    US Abdomen Limited    [place] Draw    Comprehensive Metabolic Panel    Lipase    Urinalysis With Microscopic If Indicated (No Culture) - Urine, Clean Catch    CBC Auto Differential    Pregnancy, Urine - Urine, Clean Catch    Urinalysis, Microscopic Only - Urine, Clean Catch    Lactic Acid, Plasma    Basic Metabolic Panel    Lactic Acid, Plasma    Vancomycin, Random    CBC Auto Differential    NPO Diet NPO Type: Strict NPO    Undress & Gown    Vital Signs    Intake & Output    Weigh Patient    Oral Care    Maintain IV Access    Telemetry - Place Orders & Notify Provider of Results When Patient Experiences Acute Chest Pain, Dysrhythmia or Respiratory Distress     May Be Off Telemetry for Tests    Activity - Ad Kamryn    Daily Weights    Strict Intake & Output    Code Status and Medical Interventions: CPR (Attempt to Resuscitate); Full Support    Inpatient General Surgery Consult    Oxygen Therapy- Nasal Cannula; Titrate 1-6 LPM Per SpO2; 90 - 95%    Insert Peripheral IV    Insert Peripheral IV    Inpatient Admission    CBC & Differential    Green Top (Gel)    Lavender Top    Gold Top - SST    Light Blue Top    CBC & Differential         Additional orders considered but not ordered:  None    ED Course:    Consultants:  Hospitalist and Dr. Danielle    ED Course as of 03/14/25 2115   Fri Mar 14, 2025   1714 CBC & Differential(!)  CBC with elevated white blood cell count [CS]   1735 Pregnancy, Urine - Urine, Clean Catch  Pregnancy test negative, rule out ectopic pregnancy [CS]   1735 Comprehensive Metabolic Panel(!)  Mild hypokalemia on chemistries, normal LFTs [CS]   1735 Urinalysis, Microscopic Only - Urine, Clean Catch(!)  UA appears to be contaminated with significant squamous epithelial cells [CS]   1748 Lipase  Lipase negative, less likely pancreatitis [CS]   1847 CT scan shows acute appendicitis with possible rupture, will contact surgeon for consult, patient given IV antibiotics, made n.p.o., started on maintenance IV fluids [CS]   1918 Dr Danielle plans for surgical intervention tomorrow, will contact hospitalist for admission [CS]   1935 Spoke directly to Dr. Pollock who agrees to evaluate the patient for admission [CS]      ED Course User Index  [CS] Medardo Ramos MD           After my consideration of clinical presentation and any laboratory/radiology studies obtained, I discussed the findings with the patient/patient representative who is in agreement with the treatment plan and the final disposition. Risks and benefits of admission was discussed     AS OF 21:15 EDT VITALS:    BP - 125/89  HR - 103  TEMP - 98.8 °F (37.1 °C) (Oral)  O2 SATS - 96%    I  reviewed the patient's prescription monitoring report if available prior to discharge    DIAGNOSIS  Final diagnoses:   Ruptured appendicitis         DISPOSITION  ED Disposition       ED Disposition   Decision to Admit    Condition   --    Comment   Level of Care: Telemetry [5]   Diagnosis: Ruptured appendicitis [437970]   Admitting Physician: CINDY ZABALA [[id number]]   Certification: I Certify That Inpatient Hospital Services Are Medically Necessary For Greater Than 2 Midnights                     Please note that portions of this document were completed with voice recognition software.        Medardo Ramos MD  03/14/25 9640

## 2025-03-15 NOTE — PAYOR COMM NOTE
"Lorene Toscano (24 y.o. Female)       Date of Birth   2000    Social Security Number       Address   [address] [place] [address]    Home Phone   [phone]    MRN   [id number]       Pentecostal   None    Marital Status   Legally                             Admission Date   3/14/2025    Admission Type   Emergency    Admitting Provider   Sanju Pollock MD    Attending Provider   Imtiaz Pruett MD    Department, Room/Bed   [facility] OR/MAIN OR       Discharge Date       Discharge Disposition   Home or Self Care    Discharge Destination                                 Attending Provider: Imtiaz Pruett MD    Allergies: Vancomycin    Isolation: None   Infection: None   Code Status: CPR    Ht: 165.1 cm (65\")   Wt: 54.4 kg (119 lb 14.9 oz)    Admission Cmt: None   Principal Problem: Ruptured appendicitis [K35.32]                   Active Insurance as of 3/14/2025       Primary Coverage       Payor Plan Insurance Group Employer/Plan Group    [employer] [place] [employer]ICA[employer]        Payor Plan Address Payor Plan Phone Number Payor Plan Fax Number Effective Dates    [address] [address] [phone]  8/8/2022 - None Entered    [facility] [id number]         Subscriber Name Subscriber Birth Date Member ID       LORENE TOSCANO 2000 [id number]                     Emergency Contacts        (Rel.) Home Phone Work Phone Mobile Phone    RYAN TOSCANO (Mother) -- -- [phone]                 History & Physical        Efraín Danielle MD at 03/15/25 0801              Reason for Consultation: Appendicitis      Chief complaint:  Abdominal pain    SUBJECTIVE:    History of present illness:  I did see the patient in the ER today as a consultation for evaluation and treatment of 1 week history of increasing right lower quadrant abdominal discomfort.  This is gradually increased in severity, the patient reports fairly ill feeling at home and subsequently was " brought into the emergency room for further evaluation via her fiancé.  She is never felt like this in the past, pain is fairly sharp in nature, located in the right lower quadrant, worse with movement, not relieved.    Review of Systems:    Review of Systems - General ROS: negative for - chills, fatigue, fever, hot flashes, malaise or night sweats  Psychological ROS: negative for - behavioral disorder, disorientation, hallucinations, hostility or mood swings  ENT ROS: negative for - nasal polyps, oral lesions, sinus pain, sneezing or sore throat  Breast ROS: negative for - galactorrhea or new or changing breast lumps  Respiratory ROS: negative for - hemoptysis, orthopnea, pleuritic pain, sputum changes or stridor  Cardiovascular ROS: negative for - dyspnea on exertion, edema, irregular heartbeat, murmur, orthopnea, palpitations or rapid heart rate  Gastrointestinal ROS: negative for - change in stools, gas/bloating, hematemesis, melena or stool incontinence. There is a history of nausea with right lower quadrant pain  Genito-Urinary ROS: negative for - dysuria, genital ulcers, nocturia or pelvic pain  Musculoskeletal ROS: negative for - gait disturbance or muscle pain  Neurological ROS: negative for - dizziness, gait disturbance, memory loss, numbness/tingling or seizures      Allergies:  Allergies   Allergen Reactions    Vancomycin Itching       Medications:    Current Facility-Administered Medications:     diphenhydrAMINE (BENADRYL) 12.5 mg in sodium chloride 0.9 % 50 mL IVPB, 12.5 mg, Intravenous, Q6H PRN, Kacie Timmons DO, Last Rate: 200 mL/hr at 03/14/25 2207, 12.5 mg at 03/14/25 2207    enoxaparin sodium (LOVENOX) syringe 30 mg, 30 mg, Subcutaneous, Nightly, Sanju Pollock MD, 30 mg at 03/14/25 2207    lactated ringers infusion, 75 mL/hr, Intravenous, Continuous, Medardo Ramos MD, Last Rate: 75 mL/hr at 03/14/25 2039, 75 mL/hr at 03/14/25 2039    Morphine sulfate (PF) injection 2 mg, 2  mg, Intravenous, Q4H PRN, Sanju Pollock MD, 2 mg at 03/14/25 2131    nitroglycerin (NITROSTAT) SL tablet 0.4 mg, 0.4 mg, Sublingual, Q5 Min PRN, Sanju Pollock MD    Pharmacy Consult - Pharmacy to dose, , Not Applicable, Continuous PRN, Sanju Pollock MD    Pharmacy to Dose enoxaparin (LOVENOX), , Not Applicable, Continuous PRN, Sanju Pollock MD    piperacillin-tazobactam (ZOSYN) IVPB 3.375 g IVPB in 100 mL NS (VTB), 3.375 g, Intravenous, Q8H, Sanju Pollock MD, 3.375 g at 03/15/25 0743    sodium chloride 0.9 % flush 10 mL, 10 mL, Intravenous, PRN, Medardo Ramos MD    sodium chloride 0.9 % flush 10 mL, 10 mL, Intravenous, Q12H, Sanju Pollock MD, 10 mL at 03/15/25 0743    sodium chloride 0.9 % flush 10 mL, 10 mL, Intravenous, PRN, Sanju Pollock MD    sodium chloride 0.9 % infusion 40 mL, 40 mL, Intravenous, BHAKTI, Sanju Pollock MD    History:  Past Medical History:   Diagnosis Date    Anemia 2021    with first pregnancy    Asthma 2009    Hasn't used an inhaler for the last 10 years    Rh incompatibility     received 4 doses of Rhogam in hte first pregnancy       Past Surgical History:   Procedure Laterality Date    TONSILLECTOMY AND ADENOIDECTOMY  2004       History reviewed. No pertinent family history.    Social History     Tobacco Use    Smoking status: Never    Smokeless tobacco: Never   Vaping Use    Vaping status: Never Used   Substance Use Topics    Drug use: Never          OBJECTIVE:    Vital Signs   Temp:  [98.2 °F (36.8 °C)-99 °F (37.2 °C)] 98.4 °F (36.9 °C)  Heart Rate:  [] 108  Resp:  [16-20] 20  BP: (110-145)/() 118/79    Physical Exam:     General Appearance:    Alert, cooperative, complains of lower quadrant abdominal discomfort   Head:    Normocephalic, without obvious abnormality, atraumatic   Eyes:            Lids and lashes normal, conjunctivae and sclerae normal, no   icterus, no pallor, corneas clear, PERRLA   Ears:    Ears appear intact with no abnormalities  noted   Throat:   No oral lesions, no thrush, oral mucosa moist   Neck:   No adenopathy, supple, trachea midline, no thyromegaly, no   carotid bruit, no JVD   Back:     No kyphosis present, no scoliosis present, no skin lesions,      erythema or scars, no tenderness to percussion or                   palpation,   range of motion normal   Lungs:     Clear to auscultation,respirations regular, even and                  unlabored    Heart:    Regular rhythm and normal rate, normal S1 and S2, no            murmur, no gallop, no rub, no click   Chest Wall:    No abnormalities observed   Abdomen:     Normal bowel sounds, no masses, no organomegaly, soft        tender mostly right lower quadrant non-distended, no guarding, there is evidence of right lower quadrant tenderness   Extremities:   Moves all extremities well, no edema, no cyanosis, no             redness   Pulses:   Pulses palpable and equal bilaterally   Skin:   No bleeding, bruising or rash   Lymph nodes:   No palpable adenopathy   Neurologic:   Cranial nerves 2 - 12 grossly intact, sensation intact, DTR       present and equal bilaterally       Results Review:   I reviewed the patient's new clinical results.  I reviewed the patient's new imaging results and agree with the interpretation.  I reviewed the patient's other test results and agree with the interpretation    I did look at the CT scan myself this does show evidence of periappendiceal inflammation along with a large fecalith, I reviewed the old records, I have spoken to the emergency room provider twice, I have spoken to the house supervisor, have spoken with the anesthesia service, I have spoken with the nursing staff both last night and this morning, I have spoken with the hospitalist service.      ASSESSMENT/PLAN:    Acute appendicitis    I did have a detailed and extensive discussion with the patient and her fiancé in the hospital and they understand that they need to undergo robotic assisted  laparoscopic appendectomy or possible open appendectomy. Full risks and benefits of operative versus nonoperative intervention were discussed with the patient and these included things such as nonresolution of symptoms and possible worsening of symptoms without surgical intervention versus infection, bleeding, open appendectomy, postoperative abscess, etc with surgical intervention. The patient understands, agrees, and wishes to proceed with the surgical treatment plan as mentioned above. The patient had no questions for me at the end of the discussion.     I have explained to the patient about the risk of postoperative abscess and the possibility of open appendectomy.     I discussed the patients findings and my recommendations with patient and consulting provider.        Efraín Danielle MD  03/15/25              Electronically signed by Efraín Danielle MD at 03/15/25 0803       Sanju Pollock MD at 25 193              [facility] HISTORY AND PHYSICAL    Patient Identification:  Name:  Lorene Walker  Age:  24 y.o.  Sex:  female  :  2000  MRN:  [id number]   Visit Number:  [id number]  Admit Date: 3/14/2025   Room number:    Primary Care Physician:  Helen Castro, THIAGO    Date of Admission: 3/14/2025     Subjective     Chief complaint:    Chief Complaint   Patient presents with    Abdominal Pain       History of presenting illness:    This is a 24-year-old female with no past medical history presenting with abdominal pain.  Patient states that for the last week she has been experiencing worsening abdominal pain.  Patient reports that her pain initially started in the epigastric region but then radiated to her right lower quadrant.  Patient reports that her pain was so severe that she decided to present to our ED.  She denies any nausea, vomiting, fevers or chills.    In ED, blood pressure 127/95, heart rate 111, respiratory of 18, temperature 98.2 and O2 saturation  100% on room air.  Labs on arrival showed sodium 136, potassium 3.3, BUN 8, creatinine 0.67, WBC 13.3, hemoglobin 13.9 and platelet count 288.  Analysis showed a dark urine with negative nitrites and trace leukocytes.  CT abdomen showed acute appendicitis with surrounding loculated peripherally enhancing fluid consistent with ruptured with early abscess formation.    ---------------------------------------------------------------------------------------------------------------------   Review of Systems   Constitutional:  Negative for chills.   HENT:  Negative for dental problem and rhinorrhea.    Eyes:  Negative for photophobia.   Respiratory:  Negative for choking.    Cardiovascular:  Negative for leg swelling.   Gastrointestinal:  Positive for abdominal pain.   Endocrine: Negative for heat intolerance.   Genitourinary:  Negative for decreased urine volume and dyspareunia.   Musculoskeletal:  Negative for gait problem and neck pain.   Skin:  Negative for pallor.   Allergic/Immunologic: Negative for food allergies.   Neurological:  Negative for headaches.   Psychiatric/Behavioral:  Negative for dysphoric mood.      ---------------------------------------------------------------------------------------------------------------------   Past Medical History:   Diagnosis Date    Anemia 2021    with first pregnancy    Asthma 2009    Hasn't used an inhaler for the last 10 years    Rh incompatibility     received 4 doses of Rhogam in hte first pregnancy     Past Surgical History:   Procedure Laterality Date    TONSILLECTOMY AND ADENOIDECTOMY  2004     History reviewed. No pertinent family history.  Social History     Socioeconomic History    Marital status: Legally      Spouse name: Christophe Santos    Number of children: 1    Years of education: [employer]    Highest education level: Associate degree: occupational, technical, or vocational program   Tobacco Use    Smoking status: Never    Smokeless tobacco: Never    Vaping Use    Vaping status: Never Used   Substance and Sexual Activity    Drug use: Never    Sexual activity: Defer     ---------------------------------------------------------------------------------------------------------------------   Allergies:  Patient has no known allergies.  ---------------------------------------------------------------------------------------------------------------------   Medications below are reported home medications pulling from within the system; at this time, these medications have not been reconciled unless otherwise specified and are in the verification process for further verifcation as current home medications.      Prior to Admission Medications       Prescriptions Last Dose Informant Patient Reported? Taking?    albuterol (PROVENTIL HFA;VENTOLIN HFA) 108 (90 Base) MCG/ACT inhaler   Yes No    Inhale 2 puffs Every 4 (Four) Hours As Needed for Wheezing.    Patient not taking:  Reported on 10/8/2022    azithromycin (ZITHROMAX) 250 MG tablet  Mother No No    Take 2 tablets the first day, then 1 tablet daily for 4 days.    Patient not taking:  Reported on 10/8/2022    benzonatate (TESSALON PERLES) 100 MG capsule  Self No No    Take 1 capsule by mouth 3 (Three) Times a Day As Needed for Cough.    Patient not taking:  Reported on 10/8/2022    calcium carbonate EX (TUMS EX) 750 MG chewable tablet  Self Yes No    Chew 1 tablet Daily.    ibuprofen (ADVIL,MOTRIN) 600 MG tablet  Self No No    Take 1 tablet by mouth Every 8 (Eight) Hours As Needed for Mild Pain .    Patient not taking:  Reported on 10/8/2022    norethindrone-ethinyl estradiol (MICROGESTIN) 1-20 MG-MCG per tablet  Self Yes No    Take 1 tablet by mouth Daily.    ondansetron ODT (ZOFRAN-ODT) 4 MG disintegrating tablet  Self No No    Take 1 tablet by mouth Every 8 (Eight) Hours As Needed for Nausea or Vomiting.    Patient not taking:  Reported on 10/8/2022    ondansetron ODT (ZOFRAN-ODT) 4 MG disintegrating tablet   No No     Place 1 tablet on the tongue 4 (Four) Times a Day As Needed for Nausea.    Prenatal Vit-Fe Fumarate-FA (PRENATAL VITAMIN PO)  Self Yes No    Take 1 tablet by mouth Daily.          Objective     Vital Signs:  Temp:  [98.2 °F (36.8 °C)] 98.2 °F (36.8 °C)  Heart Rate:  [111] 111  Resp:  [18] 18  BP: (127-145)/() 144/110    Mean Arterial Pressure (Non-Invasive) for the past 24 hrs (Last 3 readings):   Noninvasive MAP (mmHg)   03/14/25 1828 118   03/14/25 1738 109     SpO2:  [95 %-100 %] 95 %  on   ;   Device (Oxygen Therapy): room air  Body mass index is 20.3 kg/m².    Wt Readings from Last 3 Encounters:   03/14/25 55.3 kg (122 lb)   10/04/23 54.6 kg (120 lb 6.4 oz)   08/22/23 54.3 kg (119 lb 9.6 oz)      ----------------------------------------------------------------------------------------------------------------------  PHYSICAL EXAMINATION:  GENERAL: The patient is well developed and nontoxic.  HEENT: Anicteric sclerae, PERRLA, EOMI. Oropharynx clear. Moist mucous membranes. Conjunctivae appear well perfused.  CHEST: Chest wall is nontender.  HEART: Regular rate and rhythm without murmurs.  LUNGS: Clear to auscultation bilaterally.  ABDOMEN: Soft, positive bowel sounds, nontender, no organomegaly.  RECTAL: Deferred.  SKIN: No rash, no excessive bruising, petechiae, or purpura.  NEUROLOGIC: Cranial nerves II-XII intact without motor/sensory deficit.    ---------------------------------------------------------------------------------------------------------------------  --------------------------------------------------------------------------------------------------------------------  LABS:    CBC and coagulation:  Results from last 7 days   Lab Units 03/14/25  1652   WBC 10*3/mm3 13.31*   HEMOGLOBIN g/dL 13.9   HEMATOCRIT % 38.6   MCV fL 86.2   MCHC g/dL 36.0*   PLATELETS 10*3/mm3 288     Acid/base balance:      Renal and electrolytes:  Results from last 7 days   Lab Units 03/14/25  1652   SODIUM mmol/L 136  "  POTASSIUM mmol/L 3.3*   CHLORIDE mmol/L 99   CO2 mmol/L 23.8   BUN mg/dL 8   CREATININE mg/dL 0.76   CALCIUM mg/dL 9.6   GLUCOSE mg/dL 131*     Estimated Creatinine Clearance: 100.5 mL/min (by C-G formula based on SCr of 0.76 mg/dL).    Liver and pancreatic function:  Results from last 7 days   Lab Units 03/14/25  1652   ALBUMIN g/dL 4.6   BILIRUBIN mg/dL 0.6   ALK PHOS U/L 100   AST (SGOT) U/L 13   ALT (SGPT) U/L 7   LIPASE U/L 22     Endocrine function:  No results found for: \"HGBA1C\"  Point of care bedside glucose levels:      No results found for: \"TSH\", \"FREET4\"  Cardiac:        Cultures:  Lab Results   Component Value Date    COLORU Dark Yellow (A) 03/14/2025    CLARITYU Clear 03/14/2025    SPECGRAV 1.020 10/08/2022    PHUR 6.0 03/14/2025    GLUCOSEU Negative 03/14/2025    KETONESU Trace (A) 03/14/2025    BLOODU Negative 03/14/2025    NITRITEU Negative 03/14/2025    LEUKOCYTESUR Trace (A) 03/14/2025    BILIRUBINUR Negative 03/14/2025    UROBILINOGEN 1.0 E.U./dL 03/14/2025    RBCUA None Seen 03/14/2025    WBCUA 3-5 (A) 03/14/2025    BACTERIA Trace (A) 03/14/2025     Microbiology Results (last 10 days)       ** No results found for the last 240 hours. **            Lab Results   Component Value Date    PREGTESTUR Negative 03/14/2025     Pain Management Panel          Latest Ref Rng & Units 10/8/2022   Pain Management Panel   Amphetamine, Urine Qual Negative Negative    Barbiturates Screen, Urine Negative Negative    Benzodiazepine Screen, Urine Negative Negative    Buprenorphine, Screen, Urine Negative Negative    Cocaine Screen, Urine Negative Negative    Methadone Screen , Urine Negative Negative    Methamphetamine, Ur Negative Negative        I have personally looked at the labs and they are summarized above.  ----------------------------------------------------------------------------------------------------------------------  Detailed radiology reports for the last 24 hours:    Imaging Results (Last 24 " Hours)       Procedure Component Value Units Date/Time    CT Abdomen Pelvis With Contrast [[id number]] Collected: 03/14/25 1843     Updated: 03/14/25 1848    Addenda:        ADDENDUM REPORT    ADDENDUM:  This report was discussed with BRITTA FRIAS MD on Mar 14, 2025   18:46:00 EDT.    Authenticated and Electronically Signed by Nicole Montilla on  03/14/2025 06:46:16 PM  Signed: 03/14/25 1846 by Nicole Montilla MD    Narrative:      FINAL REPORT    TECHNIQUE:  null    CLINICAL HISTORY:  rlq pain    COMPARISON:  null    FINDINGS:  CT abdomen and pelvis with contrast    Comparison: None    Findings:    The lung bases are clear.    Unremarkable gallbladder and solid organs. No urolithiasis.    No bowel obstruction, pneumoperitoneum, or pneumatosis.    Pelvic contents unremarkable. The appendix is markedly distended measuring 17 mm, with mural hyperemia and thickening, as well as moderate surrounding partially loculated fluid, spanning roughly 55 mm transverse. This fluid demonstrates mild surrounding   enhancement. An appendicolith is present.    No acute fracture.      Impression:      IMPRESSION:    1. Acute appendicitis with surrounding loculated peripherally enhancing fluid, consistent with rupture with early abscess formation.    Authenticated and Electronically Signed by Nicole Montilla on  03/14/2025 06:43:51 PM    US Abdomen Limited [[id number]] Resulted: 03/14/25 1826     Updated: 03/14/25 1847          Final impressions for the last 30 days of radiology reports:    CT Abdomen Pelvis With Contrast  Addendum Date: 3/14/2025  ADDENDUM REPORT ADDENDUM: This report was discussed with BRITTA FRIAS MD on Mar 14, 2025 18:46:00 EDT. Authenticated and Electronically Signed by Nicole Montilla on 03/14/2025 06:46:16 PM    Result Date: 3/14/2025  IMPRESSION: 1. Acute appendicitis with surrounding loculated peripherally enhancing fluid, consistent with rupture with early abscess formation. Authenticated and  Electronically Signed by Nicole Montilla on 2025 06:43:51 PM        Assessment & Plan    This is a 24-year-old female with no past medical history presenting with abdominal pain.  Patient states that for the last week she has been experiencing worsening abdominal pain.      Assessment:  Ruptured appendicitis  Sepsis  Early abscess formation    Plan:   - Admit to inpatient  - Start vancomycin and Zosyn  - Start IV fluids  - Monitor on telemetry  - Monitor lactic acid  - Follow-up blood cultures  - Pain management  - General surgery has been made aware and will see the patient in consult        Sanju Pollock MD  [facility]  25  19:32 EDT      Electronically signed by Sanju Pollock MD at 25          Emergency Department Notes        Medardo Ramos MD at 25           EMERGENCY DEPARTMENT ENCOUNTER    Pt Name: Lorene Walker  MRN: [id number]  Pt :   2000  Room Number:  LEAH/LEAH  Date of encounter:  3/14/2025  PCP: Helen Castro APRN  ED Provider: Medardo Ramos MD    Historian: Patient      HPI:  Chief Complaint   Patient presents with    Abdominal Pain          Context: Lorene Walker is a 24 y.o. female who presents to the ED c/o abdominal pain, located lower to the abdomen, present for 4 to 5 days, COVID nausea, severe, has gotten progressively worse.  Saw her physician today who was concerned about appendicitis and sent her to the ER.  Patient denies abdominal surgery history.      PAST MEDICAL HISTORY  Past Medical History:   Diagnosis Date    Anemia     with first pregnancy    Asthma 2009    Hasn't used an inhaler for the last 10 years    Rh incompatibility     received 4 doses of Rhogam in hte first pregnancy         PAST SURGICAL HISTORY  Past Surgical History:   Procedure Laterality Date    TONSILLECTOMY AND ADENOIDECTOMY  2004         FAMILY HISTORY  History reviewed. No pertinent family  history.      SOCIAL HISTORY  Social History     Socioeconomic History    Marital status: Legally      Spouse name: Christophe Santos    Number of children: 1    Years of education: [employer]    Highest education level: Associate degree: occupational, technical, or vocational program   Tobacco Use    Smoking status: Never    Smokeless tobacco: Never   Vaping Use    Vaping status: Never Used   Substance and Sexual Activity    Drug use: Never    Sexual activity: Defer         ALLERGIES  Patient has no known allergies.        REVIEW OF SYSTEMS  Review of Systems   Constitutional:  Negative for chills and fever.   HENT:  Negative for sore throat and trouble swallowing.    Eyes:  Negative for pain and redness.   Respiratory:  Negative for cough and shortness of breath.    Cardiovascular:  Negative for chest pain and leg swelling.   Gastrointestinal:  Positive for abdominal pain and nausea. Negative for vomiting.   Genitourinary:  Negative for dysuria and urgency.   Musculoskeletal:  Negative for back pain and neck pain.   Skin:  Negative for rash and wound.   Neurological:  Negative for dizziness and weakness.        All systems reviewed and negative except for those discussed in HPI.       PHYSICAL EXAM    I have reviewed the triage vital signs and nursing notes.    ED Triage Vitals [03/14/25 1630]   Temp Heart Rate Resp BP SpO2   98.2 °F (36.8 °C) 111 18 127/95 100 %      Temp src Heart Rate Source Patient Position BP Location FiO2 (%)   Oral Monitor Sitting Left arm --       Physical Exam  Constitutional:       Appearance: Normal appearance. She is not ill-appearing.   HENT:      Head: Normocephalic and atraumatic.      Right Ear: External ear normal.      Left Ear: External ear normal.      Nose: Nose normal.      Mouth/Throat:      Mouth: Mucous membranes are moist.      Pharynx: Oropharynx is clear.   Eyes:      Extraocular Movements: Extraocular movements intact.      Conjunctiva/sclera: Conjunctivae normal.       Pupils: Pupils are equal, round, and reactive to light.   Cardiovascular:      Rate and Rhythm: Normal rate and regular rhythm.      Pulses:           Radial pulses are 2+ on the right side and 2+ on the left side.   Pulmonary:      Effort: Pulmonary effort is normal.      Breath sounds: Normal breath sounds.   Abdominal:      General: There is no distension.      Palpations: Abdomen is soft.      Tenderness: There is abdominal tenderness in the right lower quadrant.      Comments: Peritonitis   Musculoskeletal:         General: No tenderness or deformity. Normal range of motion.      Cervical back: Normal range of motion and neck supple.      Right lower leg: No edema.      Left lower leg: No edema.   Skin:     General: Skin is warm and dry.      Capillary Refill: Capillary refill takes less than 2 seconds.   Neurological:      General: No focal deficit present.      Mental Status: She is alert and oriented to person, place, and time.            LAB RESULTS  Recent Results (from the past 24 hours)   Comprehensive Metabolic Panel    Collection Time: 03/14/25  4:52 PM    Specimen: Blood   Result Value Ref Range    Glucose 131 (H) 65 - 99 mg/dL    BUN 8 6 - 20 mg/dL    Creatinine 0.76 0.57 - 1.00 mg/dL    Sodium 136 136 - 145 mmol/L    Potassium 3.3 (L) 3.5 - 5.2 mmol/L    Chloride 99 98 - 107 mmol/L    CO2 23.8 22.0 - 29.0 mmol/L    Calcium 9.6 8.6 - 10.5 mg/dL    Total Protein 7.6 6.0 - 8.5 g/dL    Albumin 4.6 3.5 - 5.2 g/dL    ALT (SGPT) 7 1 - 33 U/L    AST (SGOT) 13 1 - 32 U/L    Alkaline Phosphatase 100 39 - 117 U/L    Total Bilirubin 0.6 0.0 - 1.2 mg/dL    Globulin 3.0 gm/dL    A/G Ratio 1.5 g/dL    BUN/Creatinine Ratio 10.5 7.0 - 25.0    Anion Gap 13.2 5.0 - 15.0 mmol/L    eGFR 113.1 >60.0 mL/min/1.73   Lipase    Collection Time: 03/14/25  4:52 PM    Specimen: Blood   Result Value Ref Range    Lipase 22 13 - 60 U/L   Green Top (Gel)    Collection Time: 03/14/25  4:52 PM   Result Value Ref Range    Extra Tube  Hold for add-ons.    Lavender Top    Collection Time: 03/14/25  4:52 PM   Result Value Ref Range    Extra Tube hold for add-on    Gold Top - SST    Collection Time: 03/14/25  4:52 PM   Result Value Ref Range    Extra Tube Hold for add-ons.    Light Blue Top    Collection Time: 03/14/25  4:52 PM   Result Value Ref Range    Extra Tube Hold for add-ons.    CBC Auto Differential    Collection Time: 03/14/25  4:52 PM    Specimen: Blood   Result Value Ref Range    WBC 13.31 (H) 3.40 - 10.80 10*3/mm3    RBC 4.48 3.77 - 5.28 10*6/mm3    Hemoglobin 13.9 12.0 - 15.9 g/dL    Hematocrit 38.6 34.0 - 46.6 %    MCV 86.2 79.0 - 97.0 fL    MCH 31.0 26.6 - 33.0 pg    MCHC 36.0 (H) 31.5 - 35.7 g/dL    RDW 11.9 (L) 12.3 - 15.4 %    RDW-SD 37.6 37.0 - 54.0 fl    MPV 10.3 6.0 - 12.0 fL    Platelets 288 140 - 450 10*3/mm3    Neutrophil % 80.0 (H) 42.7 - 76.0 %    Lymphocyte % 13.7 (L) 19.6 - 45.3 %    Monocyte % 4.1 (L) 5.0 - 12.0 %    Eosinophil % 1.7 0.3 - 6.2 %    Basophil % 0.2 0.0 - 1.5 %    Immature Grans % 0.3 0.0 - 0.5 %    Neutrophils, Absolute 10.64 (H) 1.70 - 7.00 10*3/mm3    Lymphocytes, Absolute 1.83 0.70 - 3.10 10*3/mm3    Monocytes, Absolute 0.55 0.10 - 0.90 10*3/mm3    Eosinophils, Absolute 0.22 0.00 - 0.40 10*3/mm3    Basophils, Absolute 0.03 0.00 - 0.20 10*3/mm3    Immature Grans, Absolute 0.04 0.00 - 0.05 10*3/mm3    nRBC 0.0 0.0 - 0.2 /100 WBC   Urinalysis With Microscopic If Indicated (No Culture) - Urine, Clean Catch    Collection Time: 03/14/25  5:12 PM    Specimen: Urine, Clean Catch   Result Value Ref Range    Color, UA Dark Yellow (A) Yellow, Straw    Appearance, UA Clear Clear    pH, UA 6.0 5.0 - 8.0    Specific Gravity, UA 1.026 1.005 - 1.030    Glucose, UA Negative Negative    Ketones, UA Trace (A) Negative    Bilirubin, UA Negative Negative    Blood, UA Negative Negative    Protein, UA Trace (A) Negative    Leuk Esterase, UA Trace (A) Negative    Nitrite, UA Negative Negative    Urobilinogen, UA 1.0 E.U./dL  0.2 - 1.0 E.U./dL   Pregnancy, Urine - Urine, Clean Catch    Collection Time: 03/14/25  5:12 PM    Specimen: Urine, Clean Catch   Result Value Ref Range    HCG, Urine QL Negative Negative   Urinalysis, Microscopic Only - Urine, Clean Catch    Collection Time: 03/14/25  5:12 PM    Specimen: Urine, Clean Catch   Result Value Ref Range    RBC, UA None Seen None Seen, 0-2 /HPF    WBC, UA 3-5 (A) None Seen, 0-2 /HPF    Bacteria, UA Trace (A) None Seen /HPF    Squamous Epithelial Cells, UA 7-12 (A) None Seen, 0-2 /HPF    Hyaline Casts, UA Unable to determine due to loaded field None Seen /LPF    Mucus, UA Moderate/2+ (A) None Seen, Trace /HPF    Methodology Manual Light Microscopy    Lactic Acid, Plasma    Collection Time: 03/14/25  8:43 PM    Specimen: Blood   Result Value Ref Range    Lactate 0.8 0.5 - 2.0 mmol/L       If labs were ordered, I independently reviewed the results and considered them in treating the patient.        RADIOLOGY  US Abdomen Limited  Result Date: 3/14/2025  FINAL REPORT TECHNIQUE: null CLINICAL HISTORY: ruq pain COMPARISON: null FINDINGS: Exam: Limited right upper quadrant abdominal ultrasound Comparison: None Clinical History: Right upper quadrant pain Findings: Selected images from a right upper quadrant ultrasound are provided for interpretation Normal liver echotexture. No liver lesions are seen. No gallstones within the gallbladder. Gallbladder wall is not thickened at 1.4 mm. No pericholecystic fluid. Indeterminate sonographic Johnson sign The common bile duct is not enlarged at 3.0 mm. No choledocholithiasis. The visualized pancreas has a normal appearance. Right kidney does not demonstrate any hydronephrosis or stones.     IMPRESSION: 1. No gallstones. No gallbladder wall thickening to suggest acute cholecystitis. Authenticated and Electronically Signed by Charmaine Olguin MD on 03/14/2025 09:07:14 PM    CT Abdomen Pelvis With Contrast  Addendum Date: 3/14/2025  ADDENDUM REPORT ADDENDUM:  This report was discussed with BRITTA FRIAS MD on Mar 14, 2025 18:46:00 EDT. Authenticated and Electronically Signed by Nicole Montilla on 03/14/2025 06:46:16 PM    Result Date: 3/14/2025  FINAL REPORT TECHNIQUE: null CLINICAL HISTORY: rlq pain COMPARISON: null FINDINGS: CT abdomen and pelvis with contrast Comparison: None Findings: The lung bases are clear. Unremarkable gallbladder and solid organs. No urolithiasis. No bowel obstruction, pneumoperitoneum, or pneumatosis. Pelvic contents unremarkable. The appendix is markedly distended measuring 17 mm, with mural hyperemia and thickening, as well as moderate surrounding partially loculated fluid, spanning roughly 55 mm transverse. This fluid demonstrates mild surrounding enhancement. An appendicolith is present. No acute fracture.     IMPRESSION: 1. Acute appendicitis with surrounding loculated peripherally enhancing fluid, consistent with rupture with early abscess formation. Authenticated and Electronically Signed by Nicole Montilla on 03/14/2025 06:43:51 PM          PROCEDURES    Procedures    Interpretations    O2 Sat: The patient's oxygen saturation was 96% on Room Air.  This was independently interpreted by me as Normal      Radiology: I ordered and independently reviewed the above noted radiographic studies.  I viewed images of CT Abdomen/Pelvis and US Abdomen which showed  appendicitis  per my independent interpretation. See radiologist's dictation for official interpretation.         MEDICATIONS GIVEN IN ER    Medications   sodium chloride 0.9 % flush 10 mL (has no administration in time range)   lactated ringers infusion (75 mL/hr Intravenous New Bag 3/14/25 2039)   sodium chloride 0.9 % flush 10 mL (has no administration in time range)   sodium chloride 0.9 % flush 10 mL (has no administration in time range)   sodium chloride 0.9 % infusion 40 mL (has no administration in time range)   Pharmacy to Dose enoxaparin (LOVENOX) (has no administration in  time range)   nitroglycerin (NITROSTAT) SL tablet 0.4 mg (has no administration in time range)   Pharmacy Consult - Pharmacy to dose (has no administration in time range)   Morphine sulfate (PF) injection 2 mg (has no administration in time range)   Vancomycin HCl 1,250 mg in sodium chloride 0.9 % 250 mL VTB (1,250 mg Intravenous New Bag 3/14/25 2040)   vancomycin (VANCOCIN) 1,000 mg in sodium chloride 0.9 % 250 mL IVPB-VTB (has no administration in time range)   piperacillin-tazobactam (ZOSYN) IVPB 3.375 g IVPB in 100 mL NS (VTB) (has no administration in time range)   morphine injection 4 mg (4 mg Intravenous Given 3/14/25 1732)   ondansetron (ZOFRAN) injection 4 mg (4 mg Intravenous Given 3/14/25 1732)   ketorolac (TORADOL) injection 15 mg (15 mg Intravenous Given 3/14/25 1732)   iopamidol (ISOVUE-300) 61 % injection 100 mL (100 mL Intravenous Given 3/14/25 1808)   piperacillin-tazobactam (ZOSYN) IVPB 3.375 g IVPB in 100 mL NS (VTB) (3.375 g Intravenous New Bag 3/14/25 2024)         MEDICAL DECISION MAKING, PROGRESS, and CONSULTS    All labs, if obtained, have been independently reviewed by me.  All radiology studies, if obtained, have been reviewed by me and the radiologist dictating the report.  All EKG's, if obtained, have been independently viewed and interpreted by me      Discussion below represents my analysis of pertinent findings related to patient's condition, differential diagnosis, treatment plan and final disposition.      Differential diagnosis:    24-year-old female presented ED with complaint of abdominal pain, she is very tender on the right side of the abdomen, concern for cholecystitis, versus appendicitis, versus GYN process such as ectopic pregnancy or ovarian cyst.  Will obtain CT scan abdomen pelvis, laboratory workup, provide IV morphine, IV Zofran    Additional Sources:  External (non-ED) record review:  OB/GYN note 2/26/2024       Orders placed during this visit:  Orders Placed This  Encounter   Procedures    Blood Culture - Blood,    Blood Culture - Blood,    MRSA Screen, PCR (Inpatient) - Swab, Nares    CT Abdomen Pelvis With Contrast    US Abdomen Limited    [place] Draw    Comprehensive Metabolic Panel    Lipase    Urinalysis With Microscopic If Indicated (No Culture) - Urine, Clean Catch    CBC Auto Differential    Pregnancy, Urine - Urine, Clean Catch    Urinalysis, Microscopic Only - Urine, Clean Catch    Lactic Acid, Plasma    Basic Metabolic Panel    Lactic Acid, Plasma    Vancomycin, Random    CBC Auto Differential    NPO Diet NPO Type: Strict NPO    Undress & Gown    Vital Signs    Intake & Output    Weigh Patient    Oral Care    Maintain IV Access    Telemetry - Place Orders & Notify Provider of Results When Patient Experiences Acute Chest Pain, Dysrhythmia or Respiratory Distress    May Be Off Telemetry for Tests    Activity - Ad Kamryn    Daily Weights    Strict Intake & Output    Code Status and Medical Interventions: CPR (Attempt to Resuscitate); Full Support    Inpatient General Surgery Consult    Oxygen Therapy- Nasal Cannula; Titrate 1-6 LPM Per SpO2; 90 - 95%    Insert Peripheral IV    Insert Peripheral IV    Inpatient Admission    CBC & Differential    Green Top (Gel)    Lavender Top    Gold Top - SST    Light Blue Top    CBC & Differential         Additional orders considered but not ordered:  None    ED Course:    Consultants:  Hospitalist and Dr. Danielle    ED Course as of 03/14/25 2115   Fri Mar 14, 2025   1714 CBC & Differential(!)  CBC with elevated white blood cell count [CS]   1735 Pregnancy, Urine - Urine, Clean Catch  Pregnancy test negative, rule out ectopic pregnancy [CS]   1735 Comprehensive Metabolic Panel(!)  Mild hypokalemia on chemistries, normal LFTs [CS]   1735 Urinalysis, Microscopic Only - Urine, Clean Catch(!)  UA appears to be contaminated with significant squamous epithelial cells [CS]   1748 Lipase  Lipase negative, less likely pancreatitis [CS]   1847  CT scan shows acute appendicitis with possible rupture, will contact surgeon for consult, patient given IV antibiotics, made n.p.o., started on maintenance IV fluids [CS]   1918 Dr Danielle plans for surgical intervention tomorrow, will contact hospitalist for admission [CS]   1935 Spoke directly to Dr. Zabala who agrees to evaluate the patient for admission [CS]      ED Course User Index  [CS] Medardo Ramos MD           After my consideration of clinical presentation and any laboratory/radiology studies obtained, I discussed the findings with the patient/patient representative who is in agreement with the treatment plan and the final disposition. Risks and benefits of admission was discussed     AS OF 21:15 EDT VITALS:    BP - 125/89  HR - 103  TEMP - 98.8 °F (37.1 °C) (Oral)  O2 SATS - 96%    I reviewed the patient's prescription monitoring report if available prior to discharge    DIAGNOSIS  Final diagnoses:   Ruptured appendicitis         DISPOSITION  ED Disposition       ED Disposition   Decision to Admit    Condition   --    Comment   Level of Care: Telemetry [5]   Diagnosis: Ruptured appendicitis [887202]   Admitting Physician: CINDY ZABALA [[id number]]   Certification: I Certify That Inpatient Hospital Services Are Medically Necessary For Greater Than 2 Midnights                     Please note that portions of this document were completed with voice recognition software.        Medardo Ramos MD  03/14/25 2121      Electronically signed by Medardo Ramos MD at 03/14/25 2121       Lab Results (last 24 hours)       Procedure Component Value Units Date/Time    Lactic Acid, Plasma [[id number]]  (Normal) Collected: 03/15/25 0751    Specimen: Blood Updated: 03/15/25 0828     Lactate 0.6 mmol/L     Basic Metabolic Panel [[id number]]  (Abnormal) Collected: 03/15/25 0751    Specimen: Blood Updated: 03/15/25 0817     Glucose 82 mg/dL      BUN 11 mg/dL      Creatinine 0.57 mg/dL      Sodium  140 mmol/L      Potassium 3.7 mmol/L      Chloride 105 mmol/L      CO2 24.4 mmol/L      Calcium 8.5 mg/dL      BUN/Creatinine Ratio 19.3     Anion Gap 10.6 mmol/L      eGFR 131.1 mL/min/1.73     Narrative:      GFR Categories in Chronic Kidney Disease (CKD)      GFR Category          GFR (mL/min/1.73)    Interpretation  G1                     90 or greater         Normal or high (1)  G2                      60-89                Mild decrease (1)  G3a                   45-59                Mild to moderate decrease  G3b                   30-44                Moderate to severe decrease  G4                    15-29                Severe decrease  G5                    14 or less           Kidney failure          (1)In the absence of evidence of kidney disease, neither GFR category G1 or G2 fulfill the criteria for CKD.    eGFR calculation 2021 CKD-EPI creatinine equation, which does not include race as a factor    CBC & Differential [[id number]]  (Abnormal) Collected: 03/15/25 0751    Specimen: Blood Updated: 03/15/25 0805    Narrative:      The following orders were created for panel order CBC & Differential.  Procedure                               Abnormality         Status                     ---------                               -----------         ------                     CBC Auto Differential[[id number]]        Abnormal            Final result                 Please view results for these tests on the individual orders.    CBC Auto Differential [[id number]]  (Abnormal) Collected: 03/15/25 0751    Specimen: Blood Updated: 03/15/25 0805     WBC 11.54 10*3/mm3      RBC 3.95 10*6/mm3      Hemoglobin 12.2 g/dL      Hematocrit 34.4 %      MCV 87.1 fL      MCH 30.9 pg      MCHC 35.5 g/dL      RDW 11.8 %      RDW-SD 38.1 fl      MPV 10.4 fL      Platelets 266 10*3/mm3      Neutrophil % 70.8 %      Lymphocyte % 21.7 %      Monocyte % 4.2 %      Eosinophil % 2.7 %      Basophil % 0.3 %      Immature Grans % 0.3 %       Neutrophils, Absolute 8.17 10*3/mm3      Lymphocytes, Absolute 2.50 10*3/mm3      Monocytes, Absolute 0.49 10*3/mm3      Eosinophils, Absolute 0.31 10*3/mm3      Basophils, Absolute 0.03 10*3/mm3      Immature Grans, Absolute 0.04 10*3/mm3      nRBC 0.0 /100 WBC     MRSA Screen, PCR (Inpatient) - Swab, Nares [[id number]]  (Normal) Collected: 03/14/25 2126    Specimen: Swab from Nares Updated: 03/15/25 0553     MRSA PCR No MRSA Detected    Narrative:      The negative predictive value of this diagnostic test is high and should only be used to consider de-escalating anti-MRSA therapy. A positive result may indicate colonization with MRSA and must be correlated clinically.    Lactic Acid, Plasma [[id number]]  (Normal) Collected: 03/14/25 2043    Specimen: Blood Updated: 03/14/25 2108     Lactate 0.8 mmol/L     Blood Culture - Blood, Arm, Right [[id number]] Collected: 03/14/25 1954    Specimen: Blood from Arm, Right Updated: 03/14/25 2000    Blood Culture - Blood, Arm, Left [[id number]] Collected: 03/14/25 1954    Specimen: Blood from Arm, Left Updated: 03/14/25 1959    Pregnancy, Urine - Urine, Clean Catch [[id number]]  (Normal) Collected: 03/14/25 1712    Specimen: Urine, Clean Catch Updated: 03/14/25 1735     HCG, Urine QL Negative    Urinalysis, Microscopic Only - Urine, Clean Catch [[id number]]  (Abnormal) Collected: 03/14/25 1712    Specimen: Urine, Clean Catch Updated: 03/14/25 1733     RBC, UA None Seen /HPF      WBC, UA 3-5 /HPF      Bacteria, UA Trace /HPF      Squamous Epithelial Cells, UA 7-12 /HPF      Hyaline Casts, UA       Unable to determine due to loaded field     /LPF     Mucus, UA Moderate/2+ /HPF      Methodology Manual Light Microscopy    Urinalysis With Microscopic If Indicated (No Culture) - Urine, Clean Catch [[id number]]  (Abnormal) Collected: 03/14/25 1712    Specimen: Urine, Clean Catch Updated: 03/14/25 1725     Color, UA Dark Yellow     Appearance, UA Clear     pH, UA 6.0     Specific Gravity, UA  1.026     Glucose, UA Negative     Ketones, UA Trace     Bilirubin, UA Negative     Blood, UA Negative     Protein, UA Trace     Leuk Esterase, UA Trace     Nitrite, UA Negative     Urobilinogen, UA 1.0 E.U./dL    Comprehensive Metabolic Panel [[id number]]  (Abnormal) Collected: 03/14/25 1652    Specimen: Blood Updated: 03/14/25 1714     Glucose 131 mg/dL      BUN 8 mg/dL      Creatinine 0.76 mg/dL      Sodium 136 mmol/L      Potassium 3.3 mmol/L      Chloride 99 mmol/L      CO2 23.8 mmol/L      Calcium 9.6 mg/dL      Total Protein 7.6 g/dL      Albumin 4.6 g/dL      ALT (SGPT) 7 U/L      AST (SGOT) 13 U/L      Alkaline Phosphatase 100 U/L      Total Bilirubin 0.6 mg/dL      Globulin 3.0 gm/dL      A/G Ratio 1.5 g/dL      BUN/Creatinine Ratio 10.5     Anion Gap 13.2 mmol/L      eGFR 113.1 mL/min/1.73     Narrative:      GFR Categories in Chronic Kidney Disease (CKD)      GFR Category          GFR (mL/min/1.73)    Interpretation  G1                     90 or greater         Normal or high (1)  G2                      60-89                Mild decrease (1)  G3a                   45-59                Mild to moderate decrease  G3b                   30-44                Moderate to severe decrease  G4                    15-29                Severe decrease  G5                    14 or less           Kidney failure          (1)In the absence of evidence of kidney disease, neither GFR category G1 or G2 fulfill the criteria for CKD.    eGFR calculation 2021 CKD-EPI creatinine equation, which does not include race as a factor    Lipase [[id number]]  (Normal) Collected: 03/14/25 1652    Specimen: Blood Updated: 03/14/25 1714     Lipase 22 U/L     [place] Draw [[id number]] Collected: 03/14/25 1652    Specimen: Blood Updated: 03/14/25 1700    Narrative:      The following orders were created for panel order [place] Draw.  Procedure                               Abnormality         Status                     ---------                                -----------         ------                     Green Top (Gel)[[id number]]                                  Final result               Lavender Top[[id number]]                                     Final result               Gold Top - SST[[id number]]                                   Final result               Light Blue Top[[id number]]                                   Final result                 Please view results for these tests on the individual orders.    Green Top (Gel) [[id number]] Collected: 03/14/25 1652    Specimen: Blood Updated: 03/14/25 1700     Extra Tube Hold for add-ons.     Comment: Auto resulted.       Lavender Top [[id number]] Collected: 03/14/25 1652    Specimen: Blood Updated: 03/14/25 1700     Extra Tube hold for add-on     Comment: Auto resulted       Gold Top - SST [[id number]] Collected: 03/14/25 1652    Specimen: Blood Updated: 03/14/25 1700     Extra Tube Hold for add-ons.     Comment: Auto resulted.       Light Blue Top [[id number]] Collected: 03/14/25 1652    Specimen: Blood Updated: 03/14/25 1700     Extra Tube Hold for add-ons.     Comment: Auto resulted       CBC & Differential [[id number]]  (Abnormal) Collected: 03/14/25 1652    Specimen: Blood Updated: 03/14/25 1656    Narrative:      The following orders were created for panel order CBC & Differential.  Procedure                               Abnormality         Status                     ---------                               -----------         ------                     CBC Auto Differential[[id number]]        Abnormal            Final result                 Please view results for these tests on the individual orders.    CBC Auto Differential [[id number]]  (Abnormal) Collected: 03/14/25 1652    Specimen: Blood Updated: 03/14/25 1656     WBC 13.31 10*3/mm3      RBC 4.48 10*6/mm3      Hemoglobin 13.9 g/dL      Hematocrit 38.6 %      MCV 86.2 fL      MCH 31.0 pg      MCHC 36.0 g/dL      RDW 11.9 %      RDW-SD 37.6 fl       MPV 10.3 fL      Platelets 288 10*3/mm3      Neutrophil % 80.0 %      Lymphocyte % 13.7 %      Monocyte % 4.1 %      Eosinophil % 1.7 %      Basophil % 0.2 %      Immature Grans % 0.3 %      Neutrophils, Absolute 10.64 10*3/mm3      Lymphocytes, Absolute 1.83 10*3/mm3      Monocytes, Absolute 0.55 10*3/mm3      Eosinophils, Absolute 0.22 10*3/mm3      Basophils, Absolute 0.03 10*3/mm3      Immature Grans, Absolute 0.04 10*3/mm3      nRBC 0.0 /100 WBC           Imaging Results (Last 24 Hours)       Procedure Component Value Units Date/Time    US Abdomen Limited [[id number]] Collected: 03/14/25 2107     Updated: 03/14/25 2109    Narrative:      FINAL REPORT    TECHNIQUE:  null    CLINICAL HISTORY:  ruq pain    COMPARISON:  null    FINDINGS:  Exam: Limited right upper quadrant abdominal ultrasound    Comparison: None    Clinical History: Right upper quadrant pain    Findings:    Selected images from a right upper quadrant ultrasound are provided for interpretation    Normal liver echotexture. No liver lesions are seen.    No gallstones within the gallbladder.    Gallbladder wall is not thickened at 1.4 mm.    No pericholecystic fluid.    Indeterminate sonographic Johnson sign    The common bile duct is not enlarged at 3.0 mm.    No choledocholithiasis.    The visualized pancreas has a normal appearance.    Right kidney does not demonstrate any hydronephrosis or stones.      Impression:      IMPRESSION:    1. No gallstones. No gallbladder wall thickening to suggest acute cholecystitis.    Authenticated and Electronically Signed by Charmaine Olguin MD  on 03/14/2025 09:07:14 PM    CT Abdomen Pelvis With Contrast [[id number]] Collected: 03/14/25 1843     Updated: 03/14/25 1848    Addenda:        ADDENDUM REPORT    ADDENDUM:  This report was discussed with BRITTA FRIAS MD on Mar 14, 2025   18:46:00 EDT.    Authenticated and Electronically Signed by Nicole Montilla on  03/14/2025 06:46:16 PM  Signed: 03/14/25 1846 by  Nicole Montilla MD    Narrative:      FINAL REPORT    TECHNIQUE:  null    CLINICAL HISTORY:  rlq pain    COMPARISON:  null    FINDINGS:  CT abdomen and pelvis with contrast    Comparison: None    Findings:    The lung bases are clear.    Unremarkable gallbladder and solid organs. No urolithiasis.    No bowel obstruction, pneumoperitoneum, or pneumatosis.    Pelvic contents unremarkable. The appendix is markedly distended measuring 17 mm, with mural hyperemia and thickening, as well as moderate surrounding partially loculated fluid, spanning roughly 55 mm transverse. This fluid demonstrates mild surrounding   enhancement. An appendicolith is present.    No acute fracture.      Impression:      IMPRESSION:    1. Acute appendicitis with surrounding loculated peripherally enhancing fluid, consistent with rupture with early abscess formation.    Authenticated and Electronically Signed by Nicole Montilla on  03/14/2025 06:43:51 PM             Operative/Procedure Notes (last 24 hours)        Efraín Danielle MD at 03/15/25 0834          PATIENT:    Lorene Walker    DATE OF SURGERY:    3/15/2025    PHYSICIAN:    Efraín Danielle MD    REFERRING PHYSICIAN:  Helen Castro APRN    YOB: 2000    PREOPERATIVE DIAGNOSIS:  Acute appendicitis.    POSTOPERATIVE DIAGNOSIS:  Acute appendicitis.    PROCEDURE:  Robotic assisted laparoscopic appendectomy.    EBL:  Less than 50 cc    COMPLICATIONS:  None    OPERATIVE PROCEDURE:  The patient was taken to the operating room in the normal manner and given general endotracheal anesthesia.  The patient was also given preoperative IV antibiotics.  I did discuss the situation with the patient preoperatively and I marked them accordingly.  An appropriate timeout was performed intraoperatively prior to the incision.      A left paraumbilical incision slightly above and to the left of the umbilicus was made to insert a Veress needle to insufflate the abdomen with CO2.  A  8 mm Optiview was inserted here and good visualization was obtained.  A separate left lower quadrant 8 mm Optiview was inserted under direct vision along with the left upper quadrant 12 mm Optiview.      The appendix was found to be in the right lower quadrant and was dissected free.  It was grasped with graspers without teeth.  The appendiceal mesentery was dissected free and then divided with  a robotic GI stapling device as was the base of the appendix itself.  Vessel seal device was also used on the appendiceal mesentery.  The appendix was placed in the Endobag and removed via the left upper quadrant 12 mm port site.    Bovie electrocautery was used very sparingly on the appendiceal mesentery for further hemostasis.  The abdomen was clean and dry at this point in time.  All trocars were injected with Marcaine for postoperative anesthetic and then removed under direct visualization.  0-Vicryl suture was used to close the fascia and 4-0 subcuticular stitch and Steri-Strips were used to close the skin.  The patient was stable at this point in time and subsequently transferred back to the recovery room in stable condition.    PLAN:  I did have a detailed discussion with the patient's trev Vizcarra [phone] with regard to the findings at the time of operative intervention, the patient's current condition, and the postoperative treatment plan.  They had no questions for me at the end of the discussion.      Efraín Danielle MD  3/15/2025  09:25 EDT    Electronically signed by Efraín Danielle MD at 03/15/25 0926       Physician Progress Notes (last 24 hours)  Notes from 03/14/25 0933 through 03/15/25 0933   No notes of this type exist for this encounter.       Consult Notes (last 24 hours)  Notes from 03/14/25 0933 through 03/15/25 0933   No notes of this type exist for this encounter.

## 2025-03-15 NOTE — ANESTHESIA PREPROCEDURE EVALUATION
Anesthesia Evaluation     Patient summary reviewed and Nursing notes reviewed   NPO Solid Status: > 8 hours  NPO Liquid Status: > 8 hours           Airway   Mallampati: II  TM distance: >3 FB  Neck ROM: full  Possible difficult intubation  Dental - normal exam     Pulmonary - normal exam   (+) asthma,  Cardiovascular - normal exam        Neuro/Psych  GI/Hepatic/Renal/Endo      Musculoskeletal     Abdominal  - normal exam   Substance History      OB/GYN          Other                    Anesthesia Plan    ASA 2 - emergent     general     intravenous induction     Pre-procedure education provided  Plan discussed with CRNA and attending.    CODE STATUS:    Code Status (Patient has no pulse and is not breathing): CPR (Attempt to Resuscitate)  Medical Interventions (Patient has pulse or is breathing): Full Support

## 2025-03-15 NOTE — PROGRESS NOTES
"Pharmacy Consult - Enoxaparin Dosing    Lorene Walker is a 24 y.o. female who has been consulted to dose enoxaparin for VTE prophylaxis.     Allergies    Patient has no known allergies.    Relevant clinical data and objective history reviewed:     [Ht: 165.1 cm (65\"); Wt: 53 kg (116 lb 13.5 oz)]  Body mass index is 19.44 kg/m².    Estimated Creatinine Clearance: 96.3 mL/min (by C-G formula based on SCr of 0.76 mg/dL).    Results from last 7 days   Lab Units 03/14/25  1652   HEMOGLOBIN g/dL 13.9   HEMATOCRIT % 38.6   PLATELETS 10*3/mm3 288   CREATININE mg/dL 0.76       Asessment/Plan    Initiate Enoxaparin 30 mg SQ every 24 hours  Pharmacy will monitor Ms. Walker's renal function and clinical status and adjust the enoxaparin dose and/or frequency as needed.    Thank you,  Chiqui Mckeon RPH,PharmD  3/14/2025  23:42 EDT      "

## 2025-03-15 NOTE — PLAN OF CARE
Problem: Adult Inpatient Plan of Care  Goal: Plan of Care Review  Outcome: Met  Goal: Patient-Specific Goal (Individualized)  Outcome: Met  Goal: Absence of Hospital-Acquired Illness or Injury  Outcome: Met  Intervention: Identify and Manage Fall Risk  Recent Flowsheet Documentation  Taken 3/15/2025 1200 by Dot Gill RN  Safety Promotion/Fall Prevention:   safety round/check completed   room organization consistent  Taken 3/15/2025 1100 by Dot Gill RN  Safety Promotion/Fall Prevention: safety round/check completed  Taken 3/15/2025 1000 by Dot Gill RN  Safety Promotion/Fall Prevention: patient off unit  Taken 3/15/2025 0900 by Dot Gill RN  Safety Promotion/Fall Prevention: patient off unit  Taken 3/15/2025 0745 by Dot Gill RN  Safety Promotion/Fall Prevention:   safety round/check completed   room organization consistent  Intervention: Prevent Skin Injury  Recent Flowsheet Documentation  Taken 3/15/2025 1200 by Dot Gill RN  Body Position:   position changed independently   side-lying   right  Taken 3/15/2025 1100 by Dot Gill RN  Body Position: position changed independently  Taken 3/15/2025 0745 by Dot Gill RN  Body Position: sitting up in bed  Intervention: Prevent and Manage VTE (Venous Thromboembolism) Risk  Recent Flowsheet Documentation  Taken 3/15/2025 0745 by Dot Gill RN  VTE Prevention/Management: SCDs (sequential compression devices) off  Intervention: Prevent Infection  Recent Flowsheet Documentation  Taken 3/15/2025 1200 by Dot Gill RN  Infection Prevention:   single patient room provided   rest/sleep promoted  Taken 3/15/2025 1100 by Dot Gill RN  Infection Prevention:   single patient room provided   rest/sleep promoted  Taken 3/15/2025 0745 by Dot Gill RN  Infection Prevention:   single patient room provided   rest/sleep promoted  Goal: Optimal Comfort and  Wellbeing  Outcome: Met  Intervention: Monitor Pain and Promote Comfort  Recent Flowsheet Documentation  Taken 3/15/2025 0745 by Dot Gill, RN  Pain Management Interventions: position adjusted  Intervention: Provide Person-Centered Care  Recent Flowsheet Documentation  Taken 3/15/2025 0745 by Dot Gill, GUANACO  Trust Relationship/Rapport:   care explained   choices provided  Goal: Readiness for Transition of Care  Outcome: Met     Problem: Surgery Nonspecified  Goal: Fluid and Electrolyte Balance  Outcome: Met  Goal: Absence of Infection Signs and Symptoms  Outcome: Met  Goal: Effective Oxygenation and Ventilation  Outcome: Met  Intervention: Optimize Oxygenation and Ventilation  Recent Flowsheet Documentation  Taken 3/15/2025 1200 by Dot Gill, RN  Activity Management: up ad rosina  Head of Bed (HOB) Positioning: HOB lowered  Cough And Deep Breathing: done independently per patient  Taken 3/15/2025 1100 by Dot Gill, RN  Activity Management: up ad rosina  Head of Bed (HOB) Positioning: HOB at 20-30 degrees  Taken 3/15/2025 0745 by Dot Gill, RN  Activity Management: up ad rosina  Head of Bed (HOB) Positioning: HOB elevated  Cough And Deep Breathing: done independently per patient   Goal Outcome Evaluation:

## 2025-03-15 NOTE — CASE MANAGEMENT/SOCIAL WORK
Case Management Discharge Note      Final Note: dc today home w/ family. family to transport.         Selected Continued Care - Admitted Since 3/14/2025       Destination    No services have been selected for the patient.                Durable Medical Equipment    No services have been selected for the patient.                Dialysis/Infusion    No services have been selected for the patient.                Home Medical Care    No services have been selected for the patient.                Therapy    No services have been selected for the patient.                Community Resources    No services have been selected for the patient.                Community & DME    No services have been selected for the patient.                    Transportation Services  Private: Car    Final Discharge Disposition Code: 01 - home or self-care

## 2025-03-16 NOTE — PAYOR COMM NOTE
"To:  [employer]  From: Rylee Gibbs RN  Phone: [phone]  Fax: [phone]  NPI: [id number]  TIN: [id number]  Member ID: [id number]   MRN: [id number]    Lorene Toscano (24 y.o. Female)       Date of Birth   2000    Social Security Number       Address   [facility] [place] [address]    Home Phone   [phone]    MRN   [id number]       Zoroastrian   None    Marital Status   Legally                             Admission Date   3/14/2025    Admission Type   Emergency    Admitting Provider   Sanju Pollock MD    Attending Provider       Department, Room/Bed   [facility] TELEMETRY SDS OVERFLOW, T01       Discharge Date   3/15/2025    Discharge Disposition   Home or Self Care    Discharge Destination                                 Attending Provider: (none)   Allergies: Vancomycin    Isolation: None   Infection: None   Code Status: Prior    Ht: 165.1 cm (65\")   Wt: 54.4 kg (119 lb 14.9 oz)    Admission Cmt: None   Principal Problem: Ruptured appendicitis [K35.32]                   Active Insurance as of 3/14/2025       Primary Coverage       Payor Plan Insurance Group Employer/Plan Group    [employer] [place] [employer]        Payor Plan Address Payor Plan Phone Number Payor Plan Fax Number Effective Dates    [address] [address] [phone]  2022 - None Entered    [facility] [id number]         Subscriber Name Subscriber Birth Date Member ID       LORENE TOSCANO 2000 [id number]                     Emergency Contacts        (Rel.) Home Phone Work Phone Mobile Phone    RYAN TOSCANO (Mother) -- -- [phone]                 Discharge Summary        Imtiaz Pruett MD at 03/15/25 1205              [facility]   [facility]GE SUMMARY      Name:  Lorene Toscano   Age:  24 y.o.  Sex:  female  :  2000  MRN:  [id number]   Visit Number:  [id number]    Admission Date:  3/14/2025  Date of Discharge:  " 3/15/2025  Primary Care Physician:  Helen Castro APRN    Important issues to note:    1.  Patient was admitted with abdominal pain and appendicitis.  She did undergo appendectomy by Dr. Danielle and is currently being discharged home on Lortab and Zofran.  2.  Follow-up with Dr. Danielle in 2 weeks.  3.  Follow-up with primary care provider in 1 week.    Discharge Diagnoses:     1.  Acute appendicitis, status post laparoscopic appendectomy on 3/15/2025.  2.  Mild hypokalemia, POA, resolved.    Problem List:     Active Hospital Problems    Diagnosis  POA    **Ruptured appendicitis [K35.32]  Yes      Resolved Hospital Problems   No resolved problems to display.     Presenting Problem:    Chief Complaint   Patient presents with    Abdominal Pain      Consults:     Consulting Physician(s)         Provider   Role Specialty     Efraín Danielle MD      Consulting Physician General Surgery          Procedures Performed:    APPENDECTOMY LAPAROSCOPIC WITH DAVINCI ROBOT on 3/15/2025.    History of presenting illness/Hospital Course:    Lorene Walker is a 24-year-old female with no past medical history, not taking any medications at home except for birth control pills, presenting with abdominal pain.  Patient states that for the last week she has been experiencing worsening abdominal pain.  Patient reports that her pain initially started in the epigastric region but then radiated to her right lower quadrant.  Patient reports that her pain was so severe that she decided to present to our ED.  She denies any nausea, vomiting, fevers or chills.     In ED, blood pressure 127/95, heart rate 111, respiratory of 18, temperature 98.2 and O2 saturation 100% on room air.  Labs on arrival showed sodium 136, potassium 3.3, BUN 8, creatinine 0.67, WBC 13.3, hemoglobin 13.9 and platelet count 288.  Analysis showed a dark urine with negative nitrites and trace leukocytes.  CT abdomen showed acute appendicitis with surrounding  loculated peripherally enhancing fluid consistent with ruptured with early abscess formation.  Patient's condition was discussed with Dr. Danielle from surgery and the patient was admitted to the hospitalist service.    Patient was initiated on Zosyn, IV fluids and pain control.  She was seen by Dr. Danielle next day and did undergo laparoscopic robotic assisted appendectomy.  There was no evidence of ruptured appendix on intraoperative evaluation.  Patient tolerated the procedure well and is currently able to ambulate and tolerate diet.  She is being discharged home with antiemetics and Lortab for pain control.  I have discussed the patient's treatment plan with Dr. Danielle and he did not feel that the patient needed antibiotics at home but recommended to give a dose of Zosyn prior to discharge.  I have discussed the patient's condition and discharge plan with her fiancé who is at the bedside.  Discussed with nursing staff.    Vital Signs:    Temp:  [97.9 °F (36.6 °C)-99.2 °F (37.3 °C)] 98.1 °F (36.7 °C)  Heart Rate:  [] 84  Resp:  [11-20] 16  BP: ()/() 121/79    Physical Exam:    General Appearance:  Alert and cooperative.    Head:  Atraumatic and normocephalic.   Eyes: Conjunctivae and sclerae normal, no icterus. No pallor.   Ears:  Ears with no abnormalities noted.   Throat: No oral lesions, no thrush, oral mucosa moist.   Neck: Supple, trachea midline, no thyromegaly.   Back:   No kyphoscoliosis present. No tenderness to palpation.   Lungs:   Breath sounds heard bilaterally equally.  No crackles or wheezing. No Pleural rub or bronchial breathing.   Heart:  Normal S1 and S2, no murmur, no gallop, no rub. No JVD.   Abdomen:   Decreased bowel sounds, no masses, no organomegaly. Soft, diffuse minimal tenderness noted on palpation, nondistended, no rebound tenderness.  Surgical Steri-Strips noted on the abdomen.   Extremities: Supple, no edema, no cyanosis, no clubbing.   Pulses: Pulses palpable  bilaterally.   Skin: No bleeding or rash.   Neurologic: Alert and oriented x 3. No facial asymmetry. Moves all four limbs. No tremors.     Pertinent Lab Results:     Results from last 7 days   Lab Units 03/15/25  0751 03/14/25  1652   SODIUM mmol/L 140 136   POTASSIUM mmol/L 3.7 3.3*   CHLORIDE mmol/L 105 99   CO2 mmol/L 24.4 23.8   BUN mg/dL 11 8   CREATININE mg/dL 0.57 0.76   CALCIUM mg/dL 8.5* 9.6   BILIRUBIN mg/dL  --  0.6   ALK PHOS U/L  --  100   ALT (SGPT) U/L  --  7   AST (SGOT) U/L  --  13   GLUCOSE mg/dL 82 131*     Results from last 7 days   Lab Units 03/15/25  0751 03/14/25  1652   WBC 10*3/mm3 11.54* 13.31*   HEMOGLOBIN g/dL 12.2 13.9   HEMATOCRIT % 34.4 38.6   PLATELETS 10*3/mm3 266 288       Results from last 7 days   Lab Units 03/14/25  1652   LIPASE U/L 22     Pertinent Radiology Results:    Imaging Results (All)       Procedure Component Value Units Date/Time    US Abdomen Limited [[id number]] Collected: 03/14/25 2107     Updated: 03/14/25 2109    Narrative:      FINAL REPORT    TECHNIQUE:  null    CLINICAL HISTORY:  ruq pain    COMPARISON:  null    FINDINGS:  Exam: Limited right upper quadrant abdominal ultrasound    Comparison: None    Clinical History: Right upper quadrant pain    Findings:    Selected images from a right upper quadrant ultrasound are provided for interpretation    Normal liver echotexture. No liver lesions are seen.    No gallstones within the gallbladder.    Gallbladder wall is not thickened at 1.4 mm.    No pericholecystic fluid.    Indeterminate sonographic Johnson sign    The common bile duct is not enlarged at 3.0 mm.    No choledocholithiasis.    The visualized pancreas has a normal appearance.    Right kidney does not demonstrate any hydronephrosis or stones.      Impression:      IMPRESSION:    1. No gallstones. No gallbladder wall thickening to suggest acute cholecystitis.    Authenticated and Electronically Signed by Charmaine Olguin MD  on 03/14/2025 09:07:14 PM    CT  Abdomen Pelvis With Contrast [[id number]] Collected: 03/14/25 1843     Updated: 03/14/25 1848    Addenda:        ADDENDUM REPORT    ADDENDUM:  This report was discussed with BRITTA FRIAS MD on Mar 14, 2025   18:46:00 EDT.    Authenticated and Electronically Signed by Nicole Montilla on  03/14/2025 06:46:16 PM  Signed: 03/14/25 1846 by Nicole Montilla MD    Narrative:      FINAL REPORT    TECHNIQUE:  null    CLINICAL HISTORY:  rlq pain    COMPARISON:  null    FINDINGS:  CT abdomen and pelvis with contrast    Comparison: None    Findings:    The lung bases are clear.    Unremarkable gallbladder and solid organs. No urolithiasis.    No bowel obstruction, pneumoperitoneum, or pneumatosis.    Pelvic contents unremarkable. The appendix is markedly distended measuring 17 mm, with mural hyperemia and thickening, as well as moderate surrounding partially loculated fluid, spanning roughly 55 mm transverse. This fluid demonstrates mild surrounding   enhancement. An appendicolith is present.    No acute fracture.      Impression:      IMPRESSION:    1. Acute appendicitis with surrounding loculated peripherally enhancing fluid, consistent with rupture with early abscess formation.    Authenticated and Electronically Signed by Nicole Montilla on  03/14/2025 06:43:51 PM     Condition on Discharge:      Stable.    Code status during the hospital stay:    Code Status and Medical Interventions: CPR (Attempt to Resuscitate); Full Support   Ordered at: 03/14/25 1934     Code Status (Patient has no pulse and is not breathing):    CPR (Attempt to Resuscitate)     Medical Interventions (Patient has pulse or is breathing):    Full Support     Discharge Disposition:    Home or Self Care    Discharge Medications:       Discharge Medications        New Medications        Instructions Start Date   HYDROcodone-acetaminophen 7.5-325 MG per tablet  Commonly known as: NORCO   1 tablet, Oral, Every 6 Hours PRN             Changes to  Medications        Instructions Start Date   ondansetron ODT 4 MG disintegrating tablet  Commonly known as: ZOFRAN-ODT  What changed: Another medication with the same name was removed. Continue taking this medication, and follow the directions you see here.   4 mg, Translingual, 4 Times Daily PRN             Continue These Medications        Instructions Start Date   MICROGESTIN 1-20 MG-MCG per tablet  Generic drug: norethindrone-ethinyl estradiol   1 tablet, Oral, Daily             Stop These Medications      azithromycin 250 MG tablet  Commonly known as: ZITHROMAX     benzonatate 100 MG capsule  Commonly known as: Tessalon Perles     calcium carbonate  MG chewable tablet  Commonly known as: TUMS EX     PITA FE 1/20 PO     ibuprofen 600 MG tablet  Commonly known as: ADVIL,MOTRIN     PRENATAL VITAMIN PO            Discharge Diet:     Diet Instructions       Diet: Gastrointestinal Diets; Fiber-Restricted, Low Irritant; Regular (IDDSI 7); Thin (IDDSI 0)      Discharge Diet: Gastrointestinal Diets    Gastrointestinal Diet:  Fiber-Restricted  Low Irritant       Texture: Regular (IDDSI 7)    Fluid Consistency: Thin (IDDSI 0)          Activity at Discharge:     Activity Instructions       Activity as Tolerated      1) No driving on pain medications  2) No heavy lifting  3) No strenuous activity    Activity as Tolerated            Follow-up Appointments:     Follow-up Information       Helen Castro APRN Follow up in 1 week(s).    Specialty: Family Medicine  [facility]  [facility] [address] [phone]               Efraín Danielle MD Follow up in 2 week(s).    Specialty: General Surgery  Why: I will have my office contact patient early next week for followup appt  Contact information:  [address] [address] [phone]                           Test Results Pending at Discharge:    Pending Results       Procedure [Order ID] Specimen - Date/Time    Blood Culture -  Blood, Arm, Left [[id number]] Collected: 03/14/25 1954    Specimen: Blood from Arm, Left Updated: 03/14/25 1959    Blood Culture - Blood, Arm, Right [[id number]] Collected: 03/14/25 1954    Specimen: Blood from Arm, Right Updated: 03/14/25 2000    TISSUE EXAM, [facility] (EMMY,COR,MAD) [[id number]] Collected: 03/15/25 0944    Specimen: Tissue from Large Intestine, Appendix                Imtiaz Pruett MD  03/15/25  12:05 EDT    Time: I spent 25 minutes on this discharge activity which included: face-to-face encounter with the patient, reviewing the data in the system, coordination of the care with the nursing staff as well as consultants, documentation, and entering orders.     Dictated utilizing Dragon dictation.        Electronically signed by Imtiaz Pruett MD at 03/15/25 8687

## 2025-03-17 ENCOUNTER — TELEPHONE (OUTPATIENT)
Dept: SURGERY | Facility: CLINIC | Age: 25
End: 2025-03-17
Payer: COMMERCIAL

## 2025-03-17 NOTE — TELEPHONE ENCOUNTER
Uploaded Clinicals to [facility] to obtain auth for Lap Appy With Dr. Danielle on 03/15/25. Will update communication once determination has been received. Ref # [id number]

## 2025-03-18 LAB — REF LAB TEST METHOD: NORMAL

## 2025-03-18 NOTE — PROGRESS NOTES
"Patient: Lorene Walker    YOB: 2000    Date: 03/20/2025    Primary Care Provider: Helen Castro APRN    Chief Complaint   Patient presents with    Post-op Follow-up     Lap appy        History of present illness:  I saw the patient in the office today as a followup from their recent laparoscopic appendectomy which was performed 03/15/2025.  Pathology report showed acute appendicitis and periappendicitis. They state that they have done well and are having no complaints.    Vital Signs:   Vitals:    03/20/25 1257   Pulse: 85   Temp: 98.9 °F (37.2 °C)   SpO2: 98%   Weight: 57 kg (125 lb 9.6 oz)   Height: 165.1 cm (65\")     Physical Exam:     General : no acute distress  Chest : clear bilaterally  COR : regular rate and rhythm  ABD :  soft nontender, all incisions healed nicely    Assessment / Plan:    1. Post-operative state        I did discuss the situation with the patient today in the office and they have done well from their recent laparoscopic appendectomy.  I have released the patient back to normal activity, they understand that they need to be careful about heavy lifting.  I need to see the patient back in the office only if they are having further problems, they know to call me if they are.      Electronically signed by Efraín Danielle MD  03/20/25                  "

## 2025-03-19 LAB
BACTERIA SPEC AEROBE CULT: NORMAL
BACTERIA SPEC AEROBE CULT: NORMAL

## 2025-03-20 ENCOUNTER — OFFICE VISIT (OUTPATIENT)
Dept: SURGERY | Facility: CLINIC | Age: 25
End: 2025-03-20
Payer: COMMERCIAL

## 2025-03-20 VITALS
HEIGHT: 65 IN | BODY MASS INDEX: 20.93 KG/M2 | WEIGHT: 125.6 LBS | HEART RATE: 85 BPM | OXYGEN SATURATION: 98 % | TEMPERATURE: 98.9 F

## 2025-03-20 DIAGNOSIS — Z98.890 POST-OPERATIVE STATE: Primary | ICD-10-CM

## 2025-03-20 RX ORDER — NORETHINDRONE ACETATE AND ETHINYL ESTRADIOL 1MG-20(21)
1 KIT ORAL DAILY
COMMUNITY
Start: 2025-03-10

## (undated) DEVICE — ENDOPATH XCEL BLADELESS TROCARS WITH STABILITY SLEEVES: Brand: ENDOPATH XCEL

## (undated) DEVICE — ENDOPATH PNEUMONEEDLE INSUFFLATION NEEDLES WITH LUER LOCK CONNECTORS 150MM: Brand: ENDOPATH

## (undated) DEVICE — SUCTION IRRIGATOR: Brand: ENDOWRIST

## (undated) DEVICE — ST TBG PNEUMOCLEAR EVAC SMOKE HIFLO

## (undated) DEVICE — RICH GENERAL LAPAROSCOPY: Brand: MEDLINE INDUSTRIES, INC.

## (undated) DEVICE — BLD CLIP FLT UNIV DISP GRY

## (undated) DEVICE — PATIENT RETURN ELECTRODE, SINGLE-USE, CONTACT QUALITY MONITORING, ADULT, WITH 9FT CORD, FOR PATIENTS WEIGING OVER 33LBS. (15KG): Brand: MEGADYNE

## (undated) DEVICE — VESSEL SEALER EXTEND: Brand: ENDOWRIST

## (undated) DEVICE — SUT VIC 0/0 UR6 27IN DYED J603H

## (undated) DEVICE — THE ECHELON FLEX POWERED PLUS ARTICULATING ENDOSCOPIC LINEAR CUTTERS ARE STERILE, SINGLE PATIENT USE INSTRUMENTS THAT SIMULTANEOUSLYCUT AND STAPLE TISSUE. THERE ARE SIX STAGGERED ROWS OF STAPLES, THREE ON EITHER SIDE OF THE CUT LINE. THE ECHELON FLEX 45 POWERED PLUSINSTRUMENTS HAVE A STAPLE LINE THAT IS APPROXIMATELY 45 MM LONG AND A CUT LINE THAT IS APPROXIMATELY 42 MM LONG. THE SHAFT CAN ROTATE FREELYIN BOTH DIRECTIONS AND AN ARTICULATION MECHANISM ENABLES THE DISTAL PORTION OF THE SHAFT TO PIVOT TO FACILITATE LATERAL ACCESS TO THE OPERATIVESITE.THE INSTRUMENTS ARE PACKAGED WITH A PRIMARY LITHIUM BATTERY PACK THAT MUST BE INSTALLED PRIOR TO USE. THERE ARE SPECIFIC REQUIREMENTS FORDISPOSING OF THE BATTERY PACK. REFER TO THE BATTERY PACK DISPOSAL SECTION.THE INSTRUMENTS ARE PACKAGED WITHOUT A RELOAD AND MUST BE LOADED PRIOR TO USE. A STAPLE RETAINING CAP ON THE RELOAD PROTECTS THE STAPLE LEGPOINTS DURING SHIPPING AND TRANSPORTATION. THE INSTRUMENTS’ LOCK-OUT FEATURE IS DESIGNED TO PREVENT A USED OR IMPROPERLY INSTALLED RELOADFROM BEING REFIRED OR AN INSTRUMENT FROM BEING FIRED WITHOUT A RELOAD.: Brand: ECHELON FLEX

## (undated) DEVICE — ENDOPATH XCEL UNIVERSAL TROCAR STABLILITY SLEEVES: Brand: ENDOPATH XCEL

## (undated) DEVICE — SUREFORM 45: Brand: SUREFORM

## (undated) DEVICE — GLV SURG SENSICARE W/ALOE PF LF 8.5 STRL

## (undated) DEVICE — 2, DISPOSABLE SUCTION/IRRIGATOR WITHOUT DISPOSABLE TIP: Brand: STRYKEFLOW

## (undated) DEVICE — SLV SCD CALF HEMOFORCE DVT THERP REPROC MD

## (undated) DEVICE — UNDYED BRAIDED (POLYGLACTIN 910), SYNTHETIC ABSORBABLE SUTURE: Brand: COATED VICRYL